# Patient Record
Sex: FEMALE | Race: WHITE | NOT HISPANIC OR LATINO | Employment: OTHER | URBAN - METROPOLITAN AREA
[De-identification: names, ages, dates, MRNs, and addresses within clinical notes are randomized per-mention and may not be internally consistent; named-entity substitution may affect disease eponyms.]

---

## 2019-01-02 ENCOUNTER — APPOINTMENT (EMERGENCY)
Dept: RADIOLOGY | Facility: HOSPITAL | Age: 84
DRG: 871 | End: 2019-01-02
Payer: MEDICARE

## 2019-01-02 ENCOUNTER — HOSPITAL ENCOUNTER (INPATIENT)
Facility: HOSPITAL | Age: 84
LOS: 7 days | Discharge: HOME HEALTH | DRG: 871 | End: 2019-01-10
Attending: EMERGENCY MEDICINE | Admitting: INTERNAL MEDICINE
Payer: MEDICARE

## 2019-01-02 ENCOUNTER — APPOINTMENT (EMERGENCY)
Dept: CT IMAGING | Facility: HOSPITAL | Age: 84
End: 2019-01-02
Payer: MEDICARE

## 2019-01-02 ENCOUNTER — HOSPITAL ENCOUNTER (EMERGENCY)
Facility: HOSPITAL | Age: 84
Discharge: STILL ADMITTED OR ADMINISTRATIVE | End: 2019-01-02
Attending: EMERGENCY MEDICINE
Payer: MEDICARE

## 2019-01-02 VITALS
WEIGHT: 105.82 LBS | RESPIRATION RATE: 32 BRPM | DIASTOLIC BLOOD PRESSURE: 105 MMHG | HEART RATE: 118 BPM | OXYGEN SATURATION: 97 % | SYSTOLIC BLOOD PRESSURE: 217 MMHG

## 2019-01-02 DIAGNOSIS — R77.8 ELEVATED TROPONIN: ICD-10-CM

## 2019-01-02 DIAGNOSIS — R09.02 HYPOXIA: ICD-10-CM

## 2019-01-02 DIAGNOSIS — R26.2 AMBULATORY DYSFUNCTION: ICD-10-CM

## 2019-01-02 DIAGNOSIS — R79.89 ELEVATED LACTIC ACID LEVEL: ICD-10-CM

## 2019-01-02 DIAGNOSIS — E86.0 DEHYDRATION: ICD-10-CM

## 2019-01-02 DIAGNOSIS — D64.9 ANEMIA: ICD-10-CM

## 2019-01-02 DIAGNOSIS — B35.1 ONYCHOMYCOSIS: ICD-10-CM

## 2019-01-02 DIAGNOSIS — G93.41 ACUTE METABOLIC ENCEPHALOPATHY: ICD-10-CM

## 2019-01-02 DIAGNOSIS — K59.00 CONSTIPATION: ICD-10-CM

## 2019-01-02 DIAGNOSIS — R11.10 VOMITING: ICD-10-CM

## 2019-01-02 DIAGNOSIS — R55 SYNCOPE: ICD-10-CM

## 2019-01-02 DIAGNOSIS — J69.0 ASPIRATION PNEUMONIA (HCC): Primary | ICD-10-CM

## 2019-01-02 DIAGNOSIS — E87.0 HYPERNATREMIA: ICD-10-CM

## 2019-01-02 LAB
ANION GAP BLD CALC-SCNC: 18 MMOL/L (ref 4–13)
BUN BLD-MCNC: 38 MG/DL (ref 5–25)
CA-I BLD-SCNC: 1.22 MMOL/L (ref 1.12–1.32)
CHLORIDE BLD-SCNC: 105 MMOL/L (ref 100–108)
CREAT BLD-MCNC: 1.1 MG/DL (ref 0.6–1.3)
GFR SERPL CREATININE-BSD FRML MDRD: 44 ML/MIN/1.73SQ M
GLUCOSE SERPL-MCNC: 343 MG/DL (ref 65–140)
HCT VFR BLD CALC: 41 % (ref 34.8–46.1)
HGB BLDA-MCNC: 13.9 G/DL (ref 11.5–15.4)
PCO2 BLD: 24 MMOL/L (ref 21–32)
POTASSIUM BLD-SCNC: 4 MMOL/L (ref 3.5–5.3)
SODIUM BLD-SCNC: 142 MMOL/L (ref 136–145)
SPECIMEN SOURCE: ABNORMAL
SPECIMEN SOURCE: NORMAL
TROPONIN I BLD-MCNC: 0.02 NG/ML (ref 0–0.08)

## 2019-01-02 PROCEDURE — 0T9B70Z DRAINAGE OF BLADDER WITH DRAINAGE DEVICE, VIA NATURAL OR ARTIFICIAL OPENING: ICD-10-PCS | Performed by: INTERNAL MEDICINE

## 2019-01-02 PROCEDURE — 93005 ELECTROCARDIOGRAM TRACING: CPT

## 2019-01-02 PROCEDURE — 71045 X-RAY EXAM CHEST 1 VIEW: CPT

## 2019-01-02 PROCEDURE — 85610 PROTHROMBIN TIME: CPT | Performed by: EMERGENCY MEDICINE

## 2019-01-02 PROCEDURE — 94660 CPAP INITIATION&MGMT: CPT

## 2019-01-02 PROCEDURE — 99291 CRITICAL CARE FIRST HOUR: CPT

## 2019-01-02 PROCEDURE — 85025 COMPLETE CBC W/AUTO DIFF WBC: CPT | Performed by: EMERGENCY MEDICINE

## 2019-01-02 PROCEDURE — 82140 ASSAY OF AMMONIA: CPT | Performed by: EMERGENCY MEDICINE

## 2019-01-02 PROCEDURE — 85014 HEMATOCRIT: CPT

## 2019-01-02 PROCEDURE — 85730 THROMBOPLASTIN TIME PARTIAL: CPT | Performed by: EMERGENCY MEDICINE

## 2019-01-02 PROCEDURE — 87040 BLOOD CULTURE FOR BACTERIA: CPT | Performed by: EMERGENCY MEDICINE

## 2019-01-02 PROCEDURE — 84484 ASSAY OF TROPONIN QUANT: CPT

## 2019-01-02 PROCEDURE — 36415 COLL VENOUS BLD VENIPUNCTURE: CPT | Performed by: EMERGENCY MEDICINE

## 2019-01-02 PROCEDURE — 94760 N-INVAS EAR/PLS OXIMETRY 1: CPT

## 2019-01-02 PROCEDURE — 1124F ACP DISCUSS-NO DSCNMKR DOCD: CPT | Performed by: INTERNAL MEDICINE

## 2019-01-02 PROCEDURE — 83605 ASSAY OF LACTIC ACID: CPT | Performed by: EMERGENCY MEDICINE

## 2019-01-02 PROCEDURE — 80047 BASIC METABLC PNL IONIZED CA: CPT

## 2019-01-02 PROCEDURE — 80320 DRUG SCREEN QUANTALCOHOLS: CPT | Performed by: EMERGENCY MEDICINE

## 2019-01-02 PROCEDURE — 85379 FIBRIN DEGRADATION QUANT: CPT | Performed by: EMERGENCY MEDICINE

## 2019-01-02 RX ORDER — ONDANSETRON 2 MG/ML
4 INJECTION INTRAMUSCULAR; INTRAVENOUS ONCE
Status: COMPLETED | OUTPATIENT
Start: 2019-01-02 | End: 2019-01-03

## 2019-01-02 RX ORDER — SODIUM CHLORIDE 9 MG/ML
125 INJECTION, SOLUTION INTRAVENOUS CONTINUOUS
Status: DISCONTINUED | OUTPATIENT
Start: 2019-01-03 | End: 2019-01-03

## 2019-01-03 ENCOUNTER — APPOINTMENT (EMERGENCY)
Dept: CT IMAGING | Facility: HOSPITAL | Age: 84
DRG: 871 | End: 2019-01-03
Payer: MEDICARE

## 2019-01-03 PROBLEM — D72.829 LEUKOCYTOSIS: Status: ACTIVE | Noted: 2019-01-03

## 2019-01-03 PROBLEM — R73.9 HYPERGLYCEMIA: Status: ACTIVE | Noted: 2019-01-03

## 2019-01-03 PROBLEM — A41.9 SEVERE SEPSIS (HCC): Status: ACTIVE | Noted: 2019-01-03

## 2019-01-03 PROBLEM — R65.20 SEVERE SEPSIS (HCC): Status: ACTIVE | Noted: 2019-01-03

## 2019-01-03 PROBLEM — E87.2 LACTIC ACIDOSIS: Status: ACTIVE | Noted: 2019-01-03

## 2019-01-03 PROBLEM — B35.1 ONYCHOMYCOSIS OF TOENAIL: Status: ACTIVE | Noted: 2019-01-03

## 2019-01-03 PROBLEM — E87.2 LACTIC ACIDOSIS: Status: RESOLVED | Noted: 2019-01-03 | Resolved: 2019-01-03

## 2019-01-03 PROBLEM — G93.41 ACUTE METABOLIC ENCEPHALOPATHY: Status: ACTIVE | Noted: 2019-01-03

## 2019-01-03 PROBLEM — J69.0 ASPIRATION PNEUMONIA (HCC): Status: ACTIVE | Noted: 2019-01-03

## 2019-01-03 PROBLEM — T17.910A ASPIRATION OF GASTRIC CONTENTS: Status: ACTIVE | Noted: 2019-01-03

## 2019-01-03 PROBLEM — J96.01 ACUTE RESPIRATORY FAILURE WITH HYPOXIA (HCC): Status: ACTIVE | Noted: 2019-01-03

## 2019-01-03 PROBLEM — I21.4 NSTEMI (NON-ST ELEVATED MYOCARDIAL INFARCTION) (HCC): Status: ACTIVE | Noted: 2019-01-03

## 2019-01-03 PROBLEM — R77.8 ELEVATED TROPONIN: Status: ACTIVE | Noted: 2019-01-03

## 2019-01-03 LAB
ADDITIONAL SETTING: 10
ALBUMIN SERPL BCP-MCNC: 3.6 G/DL (ref 3.5–5)
ALP SERPL-CCNC: 76 U/L (ref 46–116)
ALT SERPL W P-5'-P-CCNC: 30 U/L (ref 12–78)
AMMONIA PLAS-SCNC: 14 UMOL/L (ref 11–35)
ANCILLARY VALUES: ABNORMAL
ANION GAP SERPL CALCULATED.3IONS-SCNC: 12 MMOL/L (ref 4–13)
APAP SERPL-MCNC: <2 UG/ML (ref 10–30)
APTT PPP: 22 SECONDS (ref 26–38)
ARTERIAL PATENCY WRIST A: ABNORMAL
AST SERPL W P-5'-P-CCNC: 24 U/L (ref 5–45)
ATRIAL RATE: 112 BPM
BACTERIA UR QL AUTO: ABNORMAL /HPF
BASE EXCESS BLDA CALC-SCNC: -5 MMOL/L (ref -2–3)
BASOPHILS # BLD AUTO: 0.06 THOUSANDS/ΜL (ref 0–0.1)
BASOPHILS NFR BLD AUTO: 1 % (ref 0–1)
BILIRUB SERPL-MCNC: 0.5 MG/DL (ref 0.2–1)
BILIRUB UR QL STRIP: NEGATIVE
BUN SERPL-MCNC: 41 MG/DL (ref 5–25)
CA-I BLD-SCNC: 1.18 MMOL/L (ref 1.12–1.32)
CALCIUM SERPL-MCNC: 8.7 MG/DL (ref 8.3–10.1)
CHLORIDE SERPL-SCNC: 106 MMOL/L (ref 100–108)
CK MB SERPL-MCNC: 1.7 % (ref 0–2.5)
CK MB SERPL-MCNC: 15.6 NG/ML (ref 0–5)
CK SERPL-CCNC: 939 U/L (ref 26–192)
CLARITY UR: CLEAR
CO2 SERPL-SCNC: 25 MMOL/L (ref 21–32)
COLOR UR: YELLOW
CREAT SERPL-MCNC: 1.2 MG/DL (ref 0.6–1.3)
DEPRECATED D DIMER PPP: 3559 NG/ML (FEU)
DS:DELIVERY SYSTEM: ABNORMAL
EOSINOPHIL # BLD AUTO: 0.15 THOUSAND/ΜL (ref 0–0.61)
EOSINOPHIL NFR BLD AUTO: 1 % (ref 0–6)
ERYTHROCYTE [DISTWIDTH] IN BLOOD BY AUTOMATED COUNT: 12.9 % (ref 11.6–15.1)
EST. AVERAGE GLUCOSE BLD GHB EST-MCNC: 100 MG/DL
ETHANOL SERPL-MCNC: <3 MG/DL (ref 0–3)
FIO2 GAS DIL.REBREATH: 70 L
FLUAV AG SPEC QL: NORMAL
FLUBV AG SPEC QL: NORMAL
GFR SERPL CREATININE-BSD FRML MDRD: 40 ML/MIN/1.73SQ M
GLUCOSE SERPL-MCNC: 115 MG/DL (ref 65–140)
GLUCOSE SERPL-MCNC: 119 MG/DL (ref 65–140)
GLUCOSE SERPL-MCNC: 122 MG/DL (ref 65–140)
GLUCOSE SERPL-MCNC: 136 MG/DL (ref 65–140)
GLUCOSE SERPL-MCNC: 144 MG/DL (ref 65–140)
GLUCOSE SERPL-MCNC: 288 MG/DL (ref 65–140)
GLUCOSE SERPL-MCNC: 323 MG/DL (ref 65–140)
GLUCOSE SERPL-MCNC: 341 MG/DL (ref 65–140)
GLUCOSE UR STRIP-MCNC: ABNORMAL MG/DL
HBA1C MFR BLD: 5.1 % (ref 4.2–6.3)
HCO3 BLDA-SCNC: 22.2 MMOL/L (ref 22–28)
HCT VFR BLD AUTO: 43.2 % (ref 34.8–46.1)
HCT VFR BLD CALC: 39 % (ref 34.8–46.1)
HGB BLD-MCNC: 13.3 G/DL (ref 11.5–15.4)
HGB BLDA-MCNC: 13.3 G/DL (ref 11.5–15.4)
HGB UR QL STRIP.AUTO: ABNORMAL
IMM GRANULOCYTES # BLD AUTO: 0.1 THOUSAND/UL (ref 0–0.2)
IMM GRANULOCYTES NFR BLD AUTO: 1 % (ref 0–2)
INR PPP: 1.09 (ref 0.86–1.17)
KETONES UR STRIP-MCNC: NEGATIVE MG/DL
LACTATE SERPL-SCNC: 2 MMOL/L (ref 0.5–2)
LACTATE SERPL-SCNC: 3.1 MMOL/L (ref 0.5–2)
LACTATE SERPL-SCNC: 4.6 MMOL/L (ref 0.5–2)
LEUKOCYTE ESTERASE UR QL STRIP: NEGATIVE
LYMPHOCYTES # BLD AUTO: 2.61 THOUSANDS/ΜL (ref 0.6–4.47)
LYMPHOCYTES NFR BLD AUTO: 20 % (ref 14–44)
MCH RBC QN AUTO: 31.3 PG (ref 26.8–34.3)
MCHC RBC AUTO-ENTMCNC: 30.8 G/DL (ref 31.4–37.4)
MCV RBC AUTO: 102 FL (ref 82–98)
MONOCYTES # BLD AUTO: 0.29 THOUSAND/ΜL (ref 0.17–1.22)
MONOCYTES NFR BLD AUTO: 2 % (ref 4–12)
NEUTROPHILS # BLD AUTO: 9.9 THOUSANDS/ΜL (ref 1.85–7.62)
NEUTS SEG NFR BLD AUTO: 75 % (ref 43–75)
NITRITE UR QL STRIP: NEGATIVE
NON-SQ EPI CELLS URNS QL MICRO: ABNORMAL /HPF
NRBC BLD AUTO-RTO: 0 /100 WBCS
NT-PROBNP SERPL-MCNC: 210 PG/ML
P AXIS: 88 DEGREES
PCO2 BLD: 24 MMOL/L (ref 21–32)
PCO2 BLD: 49.8 MM HG (ref 36–44)
PH BLD: 7.26 [PH] (ref 7.35–7.45)
PH UR STRIP.AUTO: 6 [PH] (ref 4.5–8)
PLATELET # BLD AUTO: 233 THOUSANDS/UL (ref 149–390)
PMV BLD AUTO: 9.3 FL (ref 8.9–12.7)
PO2 BLD: 152 MM HG (ref 75–129)
POTASSIUM BLD-SCNC: 3.8 MMOL/L (ref 3.5–5.3)
POTASSIUM SERPL-SCNC: 4.3 MMOL/L (ref 3.5–5.3)
PR INTERVAL: 144 MS
PRESSURE SETTING: 5
PROCALCITONIN SERPL-MCNC: 14.79 NG/ML
PROT SERPL-MCNC: 6.4 G/DL (ref 6.4–8.2)
PROT UR STRIP-MCNC: ABNORMAL MG/DL
PROTHROMBIN TIME: 13.8 SECONDS (ref 11.8–14.2)
QRS AXIS: -47 DEGREES
QRSD INTERVAL: 92 MS
QT INTERVAL: 324 MS
QTC INTERVAL: 442 MS
RBC # BLD AUTO: 4.25 MILLION/UL (ref 3.81–5.12)
RBC #/AREA URNS AUTO: ABNORMAL /HPF
RESPIRATORY RATE: 12
RSV B RNA SPEC QL NAA+PROBE: NORMAL
SALICYLATES SERPL-MCNC: <3 MG/DL (ref 3–20)
SAMPLE SITE: ABNORMAL
SAO2 % BLD FROM PO2: 99 % (ref 95–98)
SODIUM BLD-SCNC: 141 MMOL/L (ref 136–145)
SODIUM SERPL-SCNC: 143 MMOL/L (ref 136–145)
SP GR UR STRIP.AUTO: 1.02 (ref 1–1.03)
SPECIMEN SOURCE: ABNORMAL
T WAVE AXIS: 94 DEGREES
TROPONIN I SERPL-MCNC: 0.09 NG/ML
TROPONIN I SERPL-MCNC: 0.37 NG/ML
TROPONIN I SERPL-MCNC: 0.39 NG/ML
TROPONIN I SERPL-MCNC: 0.5 NG/ML
TROPONIN I SERPL-MCNC: 0.78 NG/ML
TROPONIN I SERPL-MCNC: 1.02 NG/ML
TROPONIN I SERPL-MCNC: 1.28 NG/ML
TROPONIN I SERPL-MCNC: 1.34 NG/ML
TSH SERPL DL<=0.05 MIU/L-ACNC: 2.76 UIU/ML (ref 0.36–3.74)
UROBILINOGEN UR QL STRIP.AUTO: 0.2 E.U./DL
VENTILATION VALUE: 0
VENTRICULAR RATE: 112 BPM
WBC # BLD AUTO: 13.11 THOUSAND/UL (ref 4.31–10.16)
WBC #/AREA URNS AUTO: ABNORMAL /HPF

## 2019-01-03 PROCEDURE — 82948 REAGENT STRIP/BLOOD GLUCOSE: CPT

## 2019-01-03 PROCEDURE — 84295 ASSAY OF SERUM SODIUM: CPT

## 2019-01-03 PROCEDURE — 80053 COMPREHEN METABOLIC PANEL: CPT | Performed by: EMERGENCY MEDICINE

## 2019-01-03 PROCEDURE — 74177 CT ABD & PELVIS W/CONTRAST: CPT

## 2019-01-03 PROCEDURE — 93010 ELECTROCARDIOGRAM REPORT: CPT | Performed by: INTERNAL MEDICINE

## 2019-01-03 PROCEDURE — 94660 CPAP INITIATION&MGMT: CPT

## 2019-01-03 PROCEDURE — 83880 ASSAY OF NATRIURETIC PEPTIDE: CPT | Performed by: EMERGENCY MEDICINE

## 2019-01-03 PROCEDURE — 82553 CREATINE MB FRACTION: CPT | Performed by: NURSE PRACTITIONER

## 2019-01-03 PROCEDURE — 84443 ASSAY THYROID STIM HORMONE: CPT | Performed by: EMERGENCY MEDICINE

## 2019-01-03 PROCEDURE — 82330 ASSAY OF CALCIUM: CPT

## 2019-01-03 PROCEDURE — 84484 ASSAY OF TROPONIN QUANT: CPT | Performed by: EMERGENCY MEDICINE

## 2019-01-03 PROCEDURE — 97163 PT EVAL HIGH COMPLEX 45 MIN: CPT

## 2019-01-03 PROCEDURE — 80329 ANALGESICS NON-OPIOID 1 OR 2: CPT | Performed by: EMERGENCY MEDICINE

## 2019-01-03 PROCEDURE — G8979 MOBILITY GOAL STATUS: HCPCS

## 2019-01-03 PROCEDURE — 71275 CT ANGIOGRAPHY CHEST: CPT

## 2019-01-03 PROCEDURE — 36600 WITHDRAWAL OF ARTERIAL BLOOD: CPT

## 2019-01-03 PROCEDURE — 85014 HEMATOCRIT: CPT

## 2019-01-03 PROCEDURE — 87081 CULTURE SCREEN ONLY: CPT | Performed by: NURSE PRACTITIONER

## 2019-01-03 PROCEDURE — 96375 TX/PRO/DX INJ NEW DRUG ADDON: CPT

## 2019-01-03 PROCEDURE — 87631 RESP VIRUS 3-5 TARGETS: CPT | Performed by: NURSE PRACTITIONER

## 2019-01-03 PROCEDURE — 84145 PROCALCITONIN (PCT): CPT | Performed by: NURSE PRACTITIONER

## 2019-01-03 PROCEDURE — 96365 THER/PROPH/DIAG IV INF INIT: CPT

## 2019-01-03 PROCEDURE — 96361 HYDRATE IV INFUSION ADD-ON: CPT

## 2019-01-03 PROCEDURE — G8978 MOBILITY CURRENT STATUS: HCPCS

## 2019-01-03 PROCEDURE — 82803 BLOOD GASES ANY COMBINATION: CPT

## 2019-01-03 PROCEDURE — 84132 ASSAY OF SERUM POTASSIUM: CPT

## 2019-01-03 PROCEDURE — 94760 N-INVAS EAR/PLS OXIMETRY 1: CPT

## 2019-01-03 PROCEDURE — 99223 1ST HOSP IP/OBS HIGH 75: CPT | Performed by: INTERNAL MEDICINE

## 2019-01-03 PROCEDURE — 87040 BLOOD CULTURE FOR BACTERIA: CPT | Performed by: EMERGENCY MEDICINE

## 2019-01-03 PROCEDURE — 36415 COLL VENOUS BLD VENIPUNCTURE: CPT | Performed by: EMERGENCY MEDICINE

## 2019-01-03 PROCEDURE — 83605 ASSAY OF LACTIC ACID: CPT | Performed by: EMERGENCY MEDICINE

## 2019-01-03 PROCEDURE — 82550 ASSAY OF CK (CPK): CPT | Performed by: NURSE PRACTITIONER

## 2019-01-03 PROCEDURE — 84484 ASSAY OF TROPONIN QUANT: CPT | Performed by: NURSE PRACTITIONER

## 2019-01-03 PROCEDURE — 93005 ELECTROCARDIOGRAM TRACING: CPT

## 2019-01-03 PROCEDURE — 83036 HEMOGLOBIN GLYCOSYLATED A1C: CPT | Performed by: NURSE PRACTITIONER

## 2019-01-03 PROCEDURE — 81001 URINALYSIS AUTO W/SCOPE: CPT

## 2019-01-03 PROCEDURE — 70450 CT HEAD/BRAIN W/O DYE: CPT

## 2019-01-03 PROCEDURE — 82947 ASSAY GLUCOSE BLOOD QUANT: CPT

## 2019-01-03 PROCEDURE — 96368 THER/DIAG CONCURRENT INF: CPT

## 2019-01-03 RX ORDER — VANCOMYCIN HYDROCHLORIDE 1 G/200ML
1000 INJECTION, SOLUTION INTRAVENOUS ONCE
Status: COMPLETED | OUTPATIENT
Start: 2019-01-03 | End: 2019-01-03

## 2019-01-03 RX ORDER — HEPARIN SODIUM 5000 [USP'U]/ML
5000 INJECTION, SOLUTION INTRAVENOUS; SUBCUTANEOUS EVERY 12 HOURS SCHEDULED
Status: DISCONTINUED | OUTPATIENT
Start: 2019-01-03 | End: 2019-01-10 | Stop reason: HOSPADM

## 2019-01-03 RX ORDER — ACETAMINOPHEN 650 MG/1
650 SUPPOSITORY RECTAL ONCE
Status: COMPLETED | OUTPATIENT
Start: 2019-01-03 | End: 2019-01-03

## 2019-01-03 RX ORDER — SODIUM CHLORIDE, SODIUM GLUCONATE, SODIUM ACETATE, POTASSIUM CHLORIDE, MAGNESIUM CHLORIDE, SODIUM PHOSPHATE, DIBASIC, AND POTASSIUM PHOSPHATE .53; .5; .37; .037; .03; .012; .00082 G/100ML; G/100ML; G/100ML; G/100ML; G/100ML; G/100ML; G/100ML
75 INJECTION, SOLUTION INTRAVENOUS CONTINUOUS
Status: DISCONTINUED | OUTPATIENT
Start: 2019-01-03 | End: 2019-01-04

## 2019-01-03 RX ORDER — HEPARIN SODIUM 5000 [USP'U]/ML
5000 INJECTION, SOLUTION INTRAVENOUS; SUBCUTANEOUS EVERY 8 HOURS SCHEDULED
Status: DISCONTINUED | OUTPATIENT
Start: 2019-01-03 | End: 2019-01-03

## 2019-01-03 RX ORDER — RIBOFLAVIN (VITAMIN B2) 100 MG
100 TABLET ORAL DAILY
COMMUNITY

## 2019-01-03 RX ORDER — LEVALBUTEROL 1.25 MG/.5ML
1.25 SOLUTION, CONCENTRATE RESPIRATORY (INHALATION) EVERY 4 HOURS PRN
Status: DISCONTINUED | OUTPATIENT
Start: 2019-01-03 | End: 2019-01-05

## 2019-01-03 RX ORDER — ONDANSETRON 2 MG/ML
4 INJECTION INTRAMUSCULAR; INTRAVENOUS EVERY 4 HOURS PRN
Status: DISCONTINUED | OUTPATIENT
Start: 2019-01-03 | End: 2019-01-10 | Stop reason: HOSPADM

## 2019-01-03 RX ORDER — CHLORHEXIDINE GLUCONATE 0.12 MG/ML
15 RINSE ORAL EVERY 12 HOURS SCHEDULED
Status: DISCONTINUED | OUTPATIENT
Start: 2019-01-03 | End: 2019-01-04

## 2019-01-03 RX ORDER — AMOXICILLIN 250 MG
1 CAPSULE ORAL 2 TIMES DAILY
Status: DISCONTINUED | OUTPATIENT
Start: 2019-01-03 | End: 2019-01-10 | Stop reason: HOSPADM

## 2019-01-03 RX ORDER — DIPHENOXYLATE HYDROCHLORIDE AND ATROPINE SULFATE 2.5; .025 MG/1; MG/1
1 TABLET ORAL DAILY
COMMUNITY

## 2019-01-03 RX ORDER — VANCOMYCIN HYDROCHLORIDE 500 MG/100ML
10 INJECTION, SOLUTION INTRAVENOUS EVERY 24 HOURS
Status: DISCONTINUED | OUTPATIENT
Start: 2019-01-04 | End: 2019-01-04

## 2019-01-03 RX ADMIN — VANCOMYCIN HYDROCHLORIDE 1000 MG: 1 INJECTION, SOLUTION INTRAVENOUS at 02:12

## 2019-01-03 RX ADMIN — CEFEPIME HYDROCHLORIDE 1000 MG: 1 INJECTION, POWDER, FOR SOLUTION INTRAMUSCULAR; INTRAVENOUS at 08:37

## 2019-01-03 RX ADMIN — ONDANSETRON 4 MG: 2 INJECTION INTRAMUSCULAR; INTRAVENOUS at 00:16

## 2019-01-03 RX ADMIN — SODIUM CHLORIDE, SODIUM GLUCONATE, SODIUM ACETATE, POTASSIUM CHLORIDE, MAGNESIUM CHLORIDE, SODIUM PHOSPHATE, DIBASIC, AND POTASSIUM PHOSPHATE 75 ML/HR: .53; .5; .37; .037; .03; .012; .00082 INJECTION, SOLUTION INTRAVENOUS at 22:31

## 2019-01-03 RX ADMIN — PIPERACILLIN SODIUM AND TAZOBACTAM SODIUM 3.38 G: 36; 4.5 INJECTION, POWDER, FOR SOLUTION INTRAVENOUS at 02:08

## 2019-01-03 RX ADMIN — METRONIDAZOLE 500 MG: 500 INJECTION, SOLUTION INTRAVENOUS at 14:50

## 2019-01-03 RX ADMIN — ACETAMINOPHEN 650 MG: 650 SUPPOSITORY RECTAL at 03:00

## 2019-01-03 RX ADMIN — HEPARIN SODIUM 5000 UNITS: 5000 INJECTION, SOLUTION INTRAVENOUS; SUBCUTANEOUS at 06:14

## 2019-01-03 RX ADMIN — SODIUM CHLORIDE 500 ML: 0.9 INJECTION, SOLUTION INTRAVENOUS at 01:19

## 2019-01-03 RX ADMIN — SODIUM CHLORIDE, SODIUM GLUCONATE, SODIUM ACETATE, POTASSIUM CHLORIDE, MAGNESIUM CHLORIDE, SODIUM PHOSPHATE, DIBASIC, AND POTASSIUM PHOSPHATE 75 ML/HR: .53; .5; .37; .037; .03; .012; .00082 INJECTION, SOLUTION INTRAVENOUS at 07:42

## 2019-01-03 RX ADMIN — SODIUM CHLORIDE 500 ML: 0.9 INJECTION, SOLUTION INTRAVENOUS at 00:15

## 2019-01-03 RX ADMIN — METRONIDAZOLE 500 MG: 500 INJECTION, SOLUTION INTRAVENOUS at 07:45

## 2019-01-03 RX ADMIN — IODIXANOL 70 ML: 320 INJECTION, SOLUTION INTRAVASCULAR at 01:40

## 2019-01-03 RX ADMIN — HEPARIN SODIUM 5000 UNITS: 5000 INJECTION, SOLUTION INTRAVENOUS; SUBCUTANEOUS at 22:32

## 2019-01-03 RX ADMIN — SODIUM CHLORIDE 500 ML: 0.9 INJECTION, SOLUTION INTRAVENOUS at 03:38

## 2019-01-03 RX ADMIN — SODIUM CHLORIDE 125 ML/HR: 0.9 INJECTION, SOLUTION INTRAVENOUS at 03:18

## 2019-01-03 RX ADMIN — METRONIDAZOLE 500 MG: 500 INJECTION, SOLUTION INTRAVENOUS at 22:32

## 2019-01-03 NOTE — ED PROVIDER NOTES
History  Chief Complaint   Patient presents with    Respiratory Distress     Pt comes via EMs from home  Pt ambulated to the bathroom where she vomited in the toliet and found unresponsive on the floor  Pt was in the bathroom for 15 minutes before someone found her  Pt SpO2 was >50 with agonal breathing and RR of 6 when EMS arrived  EMS placed patient in CPAP  Pt vomited 2x for EMS  Pt SpO2 improved for EMS and pt woke up and became noncompliant and combative  Per EMS family states pt take no meds and is in perfect health  Pt reciceved 4mg of zofran from EMS and bs of 291   Vomiting     Patient is a 80year old female with vomiting tonight and was found unresponsive by the toilet tonight with decreased respirations  Patient became more awake for paramedics and was reportedly combative  Patient unable to provide complete hx and needed my immediate attention  Patient was BVM'd in the field and then placed on CPAP  Patient vomited and aspirated as per medic  No known fever  No recent old records from this ED seen on computer system  [facility] website checked on this patient and patient not found  History provided by:  Patient and EMS personnel  History limited by:  Mental status change   used: No        Prior to Admission Medications   Prescriptions Last Dose Informant Patient Reported? Taking? Ascorbic Acid (VITAMIN C) 100 MG tablet   Yes Yes   Sig: Take 100 mg by mouth daily   multivitamin (THERAGRAN) TABS   Yes Yes   Sig: Take 1 tablet by mouth daily   vitamin E 100 UNIT capsule   Yes Yes   Sig: Take 180 Units by mouth daily      Facility-Administered Medications: None       Past Medical History:   Diagnosis Date    Cancer ([facility] [address] )     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  Past Surgical History:   Procedure Laterality Date    OTHER SURGICAL HISTORY      Pt had surgery on head for skin cancer  History reviewed  No pertinent family history    I have reviewed and agree with the history as documented  Social History   Substance Use Topics    Smoking status: Former Smoker     Quit date: 1/3/1950    Smokeless tobacco: Never Used    Alcohol use No        Review of Systems   Unable to perform ROS: Mental status change   Gastrointestinal: Positive for vomiting  Physical Exam  Physical Exam   Constitutional: She appears distressed (moderate)  HENT:   Head: Normocephalic  Mucous membranes somewhat moist     Eyes: Pupils are equal, round, and reactive to light  No scleral icterus  Neck: Normal range of motion  Neck supple  No JVD present  No tracheal deviation present  Cardiovascular: Regular rhythm and normal heart sounds  No murmur heard  Tachycardia  Pulmonary/Chest: No stridor  She is in respiratory distress  She has no wheezes  She has no rales  Breath sounds diminished bilateral bases  Abdominal: Soft  Bowel sounds are normal  She exhibits no distension  There is no tenderness  Musculoskeletal: She exhibits no edema or deformity  Neurological:   Awake  No gross focal deficits  Skin: Skin is warm and dry  No rash noted  Psychiatric: She has a normal mood and affect  Nursing note and vitals reviewed        Vital Signs  ED Triage Vitals   Temperature Pulse Respirations Blood Pressure SpO2   01/03/19 0001 01/02/19 2345 01/02/19 2345 01/02/19 2345 01/02/19 2345   98 4 °F (36 9 °C) (!) 118 (!) 32 (!) 217/105 97 %      Temp Source Heart Rate Source Patient Position - Orthostatic VS BP Location FiO2 (%)   01/03/19 0001 01/02/19 2345 01/02/19 2345 01/02/19 2345 01/02/19 2347   Axillary Monitor Sitting Left arm 70      Pain Score       01/03/19 0038       No Pain           Vitals:    01/03/19 0200 01/03/19 0230 01/03/19 0300 01/03/19 0330   BP:  144/86 149/82 114/67   Pulse: (!) 114 (!) 110 (!) 114 104   Patient Position - Orthostatic VS:   Sitting Lying       Visual Acuity  Visual Acuity      Most Recent Value   L Pupil Size (mm)  4   R Pupil Size (mm)  4          ED Medications  Medications   sodium chloride 0 9 % infusion (125 mL/hr Intravenous New Bag 1/3/19 0318)   sodium chloride 0 9 % bolus 500 mL (500 mL Intravenous New Bag 1/3/19 0338)   chlorhexidine (PERIDEX) 0 12 % oral rinse 15 mL (not administered)   heparin (porcine) subcutaneous injection 5,000 Units (not administered)   piperacillin-tazobactam (ZOSYN) 3 375 g in sodium chloride 0 9 % 50 mL IVPB (not administered)   ondansetron (ZOFRAN) injection 4 mg (4 mg Intravenous Given 1/3/19 0016)   sodium chloride 0 9 % bolus 500 mL (0 mL Intravenous Stopped 1/3/19 0114)    EMS REPLENISHMENT MED ( Does not apply Given to EMS 1/3/19 0012)   piperacillin-tazobactam (ZOSYN) 3 375 g in sodium chloride 0 9 % 50 mL IVPB (0 g Intravenous Stopped 1/3/19 0319)   vancomycin (VANCOCIN) IVPB (premix) 1,000 mg (0 mg Intravenous Stopped 1/3/19 0318)   sodium chloride 0 9 % bolus 500 mL (0 mL Intravenous Stopped 1/3/19 0337)   iodixanol (VISIPAQUE) 320 MG/ML injection 70 mL (70 mL Intravenous Given 1/3/19 0140)   acetaminophen (TYLENOL) rectal suppository 650 mg (650 mg Rectal Given 1/3/19 0300)       Diagnostic Studies  Results Reviewed     Procedure Component Value Units Date/Time    Hemoglobin A1C w/ EAG Estimation [[id number]]     Lab Status:  No result Specimen:  Blood     Lactic acid, plasma [[id number]]     Lab Status:  No result Specimen:  Blood     Lactic acid, plasma [[id number]]  (Abnormal) Collected:  01/03/19 0158    Lab Status:  Final result Specimen:  Blood from Arm, Right Updated:  01/03/19 0231     LACTIC ACID 3 1 (HH) mmol/L     Narrative:         Result may be elevated if tourniquet was used during collection      Urine Microscopic [[id number]]  (Abnormal) Collected:  01/03/19 0156    Lab Status:  Final result Specimen:  Urine from Urine, Indwelling Albert Catheter Updated:  01/03/19 0217     RBC, UA 4-10 (A) /hpf      WBC, UA None Seen /hpf      Epithelial Cells None Seen /hpf      Bacteria, UA None Seen /hpf     ED Urine Macroscopic [[id number]]  (Abnormal) Collected:  01/03/19 0156    Lab Status:  Final result Specimen:  Urine Updated:  01/03/19 0156     Color, UA Yellow     Clarity, UA Clear     pH, UA 6 0     Leukocytes, UA Negative     Nitrite, UA Negative     Protein, UA 30 (1+) (A) mg/dl      Glucose,  (1/2%) (A) mg/dl      Ketones, UA Negative mg/dl      Urobilinogen, UA 0 2 E U /dl      Bilirubin, UA Negative     Blood, UA Small (A)     Specific [place], UA 1 025    Narrative:       CLINITEK RESULT    Blood culture #1 [[id number]] Collected:  01/03/19 0117    Lab Status: In process Specimen:  Blood from Arm, Left Updated:  01/03/19 0121    TSH [[id number]]  (Normal) Collected:  01/03/19 0013    Lab Status:  Final result Specimen:  Blood from Arm, Right Updated:  01/03/19 0051     TSH 3RD GENERATON 2 758 uIU/mL     Narrative:         Patients undergoing fluorescein dye angiography may retain small amounts of fluorescein in the body for 48-72 hours post procedure  Samples containing fluorescein can produce falsely depressed TSH values  If the patient had this procedure,a specimen should be resubmitted post fluorescein clearance            The recommended reference ranges for TSH during pregnancy are as follows:  First trimester 0 1 to 2 5 uIU/mL  Second trimester  0 2 to 3 0 uIU/mL  Third trimester 0 3 to 3 0 uIU/m      B-type natriuretic peptide [[id number]]  (Normal) Collected:  01/03/19 0013    Lab Status:  Final result Specimen:  Blood from Arm, Right Updated:  01/03/19 0051     NT-proBNP 868 pg/mL     Salicylate level [[id number]]  (Abnormal) Collected:  01/03/19 0013    Lab Status:  Final result Specimen:  Blood from Arm, Right Updated:  84/15/32 2835     Salicylate Lvl <3 (L) mg/dL     Acetaminophen level [[id number]]  (Abnormal) Collected:  01/03/19 0013    Lab Status:  Final result Specimen:  Blood from Arm, Right Updated:  01/03/19 0051     Acetaminophen Level <2 (L) ug/mL     Troponin I [[id number]]  (Abnormal) Collected:  01/03/19 0013    Lab Status:  Final result Specimen:  Blood from Arm, Right Updated:  01/03/19 0044     Troponin I 0 09 (H) ng/mL     Comprehensive metabolic panel [[id number]]  (Abnormal) Collected:  01/03/19 0013    Lab Status:  Final result Specimen:  Blood from Arm, Right Updated:  01/03/19 0043     Sodium 143 mmol/L      Potassium 4 3 mmol/L      Chloride 106 mmol/L      CO2 25 mmol/L      ANION GAP 12 mmol/L      BUN 41 (H) mg/dL      Creatinine 1 20 mg/dL      Glucose 323 (H) mg/dL      Calcium 8 7 mg/dL      AST 24 U/L      ALT 30 U/L      Alkaline Phosphatase 76 U/L      Total Protein 6 4 g/dL      Albumin 3 6 g/dL      Total Bilirubin 0 50 mg/dL      eGFR 40 ml/min/1 73sq m     Narrative:         National Kidney Disease Education Program recommendations are as follows:  GFR calculation is accurate only with a steady state creatinine  Chronic Kidney disease less than 60 ml/min/1 73 sq  meters  Kidney failure less than 15 ml/min/1 73 sq  meters  Lactic acid, plasma [[id number]]  (Abnormal) Collected:  01/02/19 2347    Lab Status:  Final result Specimen:  Blood from Arm, Right Updated:  01/03/19 0026     LACTIC ACID 4 6 (HH) mmol/L     Narrative:         Result may be elevated if tourniquet was used during collection      Ethanol [[id number]]  (Normal) Collected:  01/02/19 2347    Lab Status:  Final result Specimen:  Blood from Arm, Right Updated:  01/03/19 0025     Ethanol Lvl <3 mg/dL     Fingerstick Glucose (POCT) [[id number]]  (Abnormal) Collected:  01/03/19 0007    Lab Status:  Final result Updated:  01/03/19 0020     POC Glucose 288 (H) mg/dl     Ammonia [[id number]]  (Normal) Collected:  01/02/19 2347    Lab Status:  Final result Specimen:  Blood from Arm, Right Updated:  01/03/19 0014     Ammonia 14 umol/L     APTT [[id number]]  (Abnormal) Collected:  01/02/19 2347    Lab Status:  Final result Specimen:  Blood from Arm, Right Updated:  01/03/19 0014     PTT 22 (L) seconds     D-Dimer [[id number]]  (Abnormal) Collected:  01/02/19 2347    Lab Status:  Final result Specimen:  Blood from Arm, Right Updated:  01/03/19 0014     D-Dimer, Quant 3,559 (H) ng/ml (FEU)     Protime-INR [[id number]]  (Normal) Collected:  01/02/19 2347    Lab Status:  Final result Specimen:  Blood from Arm, Right Updated:  01/03/19 0014     Protime 13 8 seconds      INR 1 09    POCT Blood Gas (CG8+) [[id number]]  (Abnormal) Collected:  01/03/19 0003    Lab Status:  Final result Updated:  01/03/19 0009     pH, Art i-STAT 7 257 (L)     pCO2, Art i-STAT 49 8 (H) mm HG      pO2, ART i-STAT 152 0 (H) mm HG      BE, i-STAT -5 (L) mmol/L      HCO3, Art i-STAT 22 2 mmol/L      CO2, i-STAT 24 mmol/L      O2 Sat, i-STAT 99 (H) %      SODIUM, I-STAT 141 mmol/l      Potassium, i-STAT 3 8 mmol/L      Calcium, Ionized i-STAT 1 18 mmol/L      Hct, i-STAT 39 %      Hgb, i-STAT 13 3 g/dl      Glucose, i-STAT 341 (H) mg/dl      POC FIO2 70 L      Specimen Type ARTERIAL     Delivery System BIPAP     Respiratory Rate 12     Vent Value 000     Ancillary Values PEEPPS     Pressure Setting 05     SITE Left Radial     Additional Settings 010     PRISCILA TEST Postive Priscila Test    CBC and differential [[id number]]  (Abnormal) Collected:  01/02/19 2347    Lab Status:  Final result Specimen:  Blood from Arm, Right Updated:  01/03/19 0000     WBC 13 11 (H) Thousand/uL      RBC 4 25 Million/uL      Hemoglobin 13 3 g/dL      Hematocrit 43 2 %       (H) fL      MCH 31 3 pg      MCHC 30 8 (L) g/dL      RDW 12 9 %      MPV 9 3 fL      Platelets 291 Thousands/uL      nRBC 0 /100 WBCs      Neutrophils Relative 75 %      Immat GRANS % 1 %      Lymphocytes Relative 20 %      Monocytes Relative 2 (L) %      Eosinophils Relative 1 %      Basophils Relative 1 %      Neutrophils Absolute 9 90 (H) Thousands/µL      Immature Grans Absolute 0 10 Thousand/uL      Lymphocytes Absolute 2 61 Thousands/µL      Monocytes Absolute 0 29 Thousand/µL      Eosinophils Absolute 0 15 Thousand/µL      Basophils Absolute 0 06 Thousands/µL     Blood culture #2 [[id number]] Collected:  01/02/19 2347    Lab Status: In process Specimen:  Blood from Arm, Right Updated:  01/02/19 2357    Blood gas, arterial [[id number]]     Lab Status:  No result Specimen:  Blood, Arterial                  PE Study with CT Abdomen and Pelvis with contrast   ED Interpretation by Arthur Roblero MD (01/03 0230)   FINDINGS:      CHEST      PULMONARY ARTERIAL TREE:  No pulmonary embolus is seen  LUNGS:  Patchy bilateral consolidative airspace disease noted   There is no tracheal or endobronchial lesion  PLEURA:  Unremarkable  HEART/AORTA:  Coronary artery and aortic atherosclerotic changes noted   No pericardial effusion   No thoracic aortic aneurysm  MEDIASTINUM AND FRANKLIN:  Unremarkable  CHEST WALL AND LOWER NECK:   Unremarkable  ABDOMEN      LIVER/BILIARY TREE:  One or more subcentimeter sharply circumscribed low-density hepatic lesion(s) are noted, too small to accurately characterize, but statistically most likely to represent subcentimeter hepatic cysts   No suspicious solid hepatic    lesion is identified   Hepatic contours are normal   No biliary dilatation  GALLBLADDER:  No calcified gallstones  No pericholecystic inflammatory change  SPLEEN:  Unremarkable  PANCREAS:  Unremarkable  ADRENAL GLANDS:  Unremarkable  KIDNEYS/URETERS:  Unremarkable  No hydronephrosis  STOMACH AND BOWEL:  Fluid-filled stomach         APPENDIX:  No findings to suggest appendicitis  ABDOMINOPELVIC CAVITY:  No ascites or free intraperitoneal air  No lymphadenopathy  VESSELS:  Atherosclerotic changes are present   No evidence of aneurysm  PELVIS      REPRODUCTIVE ORGANS:  Unremarkable for patient's age  URINARY BLADDER:  Unremarkable  ABDOMINAL WALL/INGUINAL REGIONS:  Unremarkable        OSSEOUS STRUCTURES:  No acute fracture or destructive osseous lesion  Impression:        Patchy bilateral consolidative airspace disease throughout the lungs, consistent with infectious process   Findings may be secondary to aspiration, given markedly fluid distended stomach         Findings discussed with Dr Severiano Dorado at 2:26 AM, 1/3/2019                     Workstation performed: [id number]         Final Result by Reji Isaacs MD (01/03 8931)      Patchy bilateral consolidative airspace disease throughout the lungs, consistent with infectious process  Findings may be secondary to aspiration, given markedly fluid distended stomach  Findings discussed with Dr Severiano Dorado at 2:26 AM, 1/3/2019                     Workstation performed: [id number]         CT head without contrast   ED Interpretation by Dorcas Ram MD (01/03 0207)   FINDINGS:      PARENCHYMA: Decreased attenuation is noted in periventricular and subcortical white matter demonstrating an appearance that is statistically most likely to represent moderate microangiopathic change   Chronic lacunar infarction(s) are noted in basal    ganglia  No CT signs of acute infarction   No intracranial mass, mass effect or midline shift   No acute parenchymal hemorrhage  VENTRICLES AND EXTRA-AXIAL SPACES:  Enlargement of ventricles and extra-axial CSF spaces, out of proportion to the patient's age most consistent with cerebral and cerebellar atrophy  VISUALIZED ORBITS AND PARANASAL SINUSES:  Unremarkable  CALVARIUM AND EXTRACRANIAL SOFT TISSUES:  Near-complete opacification of the left mastoid air cells is seen  Impression:        No acute intracranial abnormality         Near complete opacification of left mastoid air cells   Clinical correlation for mastoiditis is recommended                  Workstation performed: [id number]         Final Result by Julissa Pettit DO (01/03 0205)      No acute intracranial abnormality  Near complete opacification of left mastoid air cells    Clinical correlation for mastoiditis is recommended                  Workstation performed: [id number]         XR chest 1 view portable   ED Interpretation by Deep Lee MD (01/03 0002)   Scoliosis, ? RLL infiltrate read by me  Procedures  ECG 12 Lead Documentation  Date/Time: 1/2/2019 11:48 PM  Performed by: Nuris Luong  Authorized by: Nursi Luong     Indications / Diagnosis:  Hypoxia  ECG reviewed by me, the ED Provider: yes    Patient location:  ED  Previous ECG:     Previous ECG:  Unavailable  Quality:     Tracing quality:  Limited by artifact  Rate:     ECG rate:  112    ECG rate assessment: tachycardic    Rhythm:     Rhythm: sinus tachycardia    Ectopy:     Ectopy: none    QRS:     QRS axis:  Left  Conduction:     Conduction: abnormal      Abnormal conduction: LAFB    ST segments:     ST segments:  Non-specific  T waves:     T waves: non-specific             Phone Contacts  ED Phone Contact    ED Course  ED Course as of Jan 03 0421   Thu Jan 03, 2019   0042 Labs, CXR d/w patient and son and daughter, who came to the ED, with patient's permission  80 D/w Dr Veronica Lo for level 1 step down      0300 CT's d/w patient and family  Patient is not a DNR and does not have a living will  HEART Risk Score      Most Recent Value   History  1 Filed at: 01/03/2019 0054   ECG  0 Filed at: 01/03/2019 0054   Age  2 Filed at: 01/03/2019 0054   Risk Factors  0 Filed at: 01/03/2019 0054   Troponin  1 Filed at: 01/03/2019 0054   Heart Score Risk Calculator   History  1 Filed at: 01/03/2019 0054   ECG  0 Filed at: 01/03/2019 0054   Age  2 Filed at: 01/03/2019 0054   Risk Factors  0 Filed at: 01/03/2019 0054   Troponin  1 Filed at: 01/03/2019 0054   HEART Score  4 Filed at: 01/03/2019 0054   HEART Score  4 Filed at: 01/03/2019 0054                      Initial Sepsis Screening     Row Name 01/03/19 0054                Is the patient's history suggestive of a new or worsening infection?  No -AO        Suspected source of infection          Are two or more of the following signs & symptoms of infection both present and new to the patient?         Indicate SIRS criteria          If the answer is yes to both questions, suspicion of sepsis is present          If severe sepsis is present AND tissue hypoperfusion perists in the hour after fluid resuscitation or lactate > 4, the patient meets criteria for SEPTIC SHOCK          Are any of the following organ dysfunction criteria present within 6 hours of suspected infection and SIRS criteria that are NOT considered to be chronic conditions?         Organ dysfunction          Date of presentation of severe sepsis          Time of presentation of severe sepsis          Tissue hypoperfusion persists in the hour after crystalloid fluid administration, evidenced, by either:          Was hypotension present within one hour of the conclusion of crystalloid fluid administration?         Date of presentation of septic shock          Time of presentation of septic shock            User Key  (r) = Recorded By, (t) = Taken By, (c) = Cosigned By    [address] Name Provider Fabiola Freeman MD Physician                  MDM  Number of Diagnoses or Management Options  Diagnosis management comments: DDX including but not limited to: pneumonia, aspiration pneumonitis, pleural effusion, CHF, PE, PTX, ACS, MI, asthma exacerbation, COPD exacerbation, anemia, metabolic abnormality, renal failure  DDx including but not limited to: food poisoning, viral illness, metabolic abnormality, dehydration, intracranial process, GI etiology, UTI, adverse reaction; doubt acute surgical intraabdominal process, Boorhave's syndrome, mediastinitis          Amount and/or Complexity of Data Reviewed  Clinical lab tests: ordered and reviewed  Tests in the radiology section of CPT®: ordered and reviewed  Decide to obtain previous medical records or to obtain history from someone other than the patient: yes  Obtain history from someone other than the patient: yes  Independent visualization of images, tracings, or specimens: yes      The patient presented with a condition in which there was a high probability of imminent or life-threatening deterioration, and critical care services (excluding separately billable procedures) totalled 30-74 minutes  Disposition  Final diagnoses:   Aspiration pneumonia (HCC)   Hypoxia   Vomiting   Dehydration   Elevated troponin   Elevated lactic acid level   Syncope     Time reflects when diagnosis was documented in both MDM as applicable and the Disposition within this note     Time User Action Codes Description Comment    1/3/2019  2:58 AM Pamela Nett Add [J69 0] Aspiration pneumonia ([address] )     1/3/2019  2:58 AM Pamela Nett Add [R09 02] Hypoxia     1/3/2019  2:58 AM Pamela Nett Add [R11 10] Vomiting     1/3/2019  2:58 AM Pamela Nett Add [E86 0] Dehydration     1/3/2019  2:58 AM Pamela Nett Add [R74 8] Elevated troponin     1/3/2019  2:58 AM Pamela Nett Add [R79 89] Elevated lactic acid level     1/3/2019  3:00 AM Pamela Nett Add [R55] Syncope     1/3/2019  3:49 AM Lopez Fill A Add [B35 1] Onychomycosis       ED Disposition     ED Disposition Condition Comment    Admit  Case was discussed with Dr Kerri Loomis and critical care [address] [place] [address] and the patient's admission status was agreed to be Admission Status: inpatient status to the service of Dr Kerri Loomis   Follow-up Information    None         Patient's Medications   Discharge Prescriptions    No medications on file     No discharge procedures on file      ED Provider  Electronically Signed by           Diandra Luong MD  01/03/19 8795       Diandra Luong MD  01/03/19 5503

## 2019-01-03 NOTE — ED NOTES
Pt transported to CT with RN, and respiratory therapist on cardiac monitor        Gerson Bell RN  01/03/19 5054

## 2019-01-03 NOTE — H&P
History and Physical - Critical Care   Sheila Glover 89 y o  female MRN: [id number]  Unit/Bed#: ED 10 Encounter: [id number]    Reason for Admission / Chief Complaint:  Hypoxic, unresponsive and agonal breathing    History of Present Illness:  Sheila Cheema is a 80 y o  female who presents to the ER after being found at home unresponsive with agonal breathing  Per family and patient she started with episodes of vomiting yesterday evening  During 1 such episode she was in the bathroom vomiting and when family went to check on her she was unresponsive leaning against the toilet and agonal breathing  When EMS arrived her SpO2 was 50% on room air, she was placed on CPAP with improvement to 90%  In the ER she was placed on BiPAP  CT scan chest abdomen pelvis head was done:  No acute intracranial abnormalities seen; no PE, patchy bilateral consolidations and markedly fluid distended stomach  Blood cultures were obtained and she was given Zosyn and vancomycin  Pertinent labs: Lactic acid of 4 6, troponin 0 09, white count 13 11, creatinine 1 2, blood sugar of 323, and D-dimer of 3559  Patient was given a L of fluid in the ER and lactic acid improved to 3 1  She was also given Zofran and Tylenol for fever of 100 4  Patient does not regularly see a primary care, has not seen a PCP and 15 years  She has a medical history of skin cancer which was removed surgically 15 years ago  She only takes vitamins at home  Family does report that they have noticed over last few years her mental status has been declining, she asked repeating questions and she asks where her  is who  25 years ago  History obtained from chart review, the patient and daughter: Shira Castle  Past Medical History:  Past Medical History:   Diagnosis Date    Cancer [facility])     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          Past Surgical History:  Past Surgical History:   Procedure Laterality Date    OTHER SURGICAL HISTORY      Pt had surgery on head for skin cancer  Past Family History:  History reviewed  No pertinent family history  Social History:  History   Smoking Status    Former Smoker    Quit date: 1/3/1950   Smokeless Tobacco    Never Used     History   Alcohol Use No     History   Drug Use No     Marital Status:       Medications:  Current Facility-Administered Medications   Medication Dose Route Frequency    chlorhexidine (PERIDEX) 0 12 % oral rinse 15 mL  15 mL Swish & Spit Q12H [address]    heparin (porcine) subcutaneous injection 5,000 Units  5,000 Units Subcutaneous Q8H [address]    piperacillin-tazobactam (ZOSYN) 3 375 g in sodium chloride 0 9 % 50 mL IVPB  3 375 g Intravenous Q6H    sodium chloride 0 9 % bolus 500 mL  500 mL Intravenous Once    sodium chloride 0 9 % infusion  125 mL/hr Intravenous Continuous     Home medications:  Prior to Admission medications    Medication Sig Start Date End Date Taking? Authorizing Provider   Ascorbic Acid (VITAMIN C) 100 MG tablet Take 100 mg by mouth daily   Yes Historical Provider, MD   multivitamin (THERAGRAN) TABS Take 1 tablet by mouth daily   Yes Historical Provider, MD   vitamin E 100 UNIT capsule Take 180 Units by mouth daily   Yes Historical Provider, MD     Allergies:  No Known Allergies    ROS:  Difficult to obtain full ROS due to respiratory status  Review of Systems   Gastrointestinal: Positive for nausea and vomiting  Negative for diarrhea  Psychiatric/Behavioral: Positive for confusion         Vitals:  Vitals:    19 0200 19 0230 19 0300 19 0330   BP:  144/86 149/82 114/67   BP Location:   Left arm Right arm   Pulse: (!) 114 (!) 110 (!) 114 104   Resp:   (!) 32 (!) 36   Temp: 99 7 °F (37 6 °C) 100 4 °F (38 °C) 100 4 °F (38 °C) 100 °F (37 8 °C)   TempSrc:    Tympanic Core   SpO2: 98% 95% 96% 97%   Weight:         Temperature:   Temp (24hrs), Av 7 °F (37 6 °C), Min:98 4 °F (36 9 °C), Max:100 4 °F (38 °C)    Current Temperature: 100 °F (37 8 °C)    Weights:   IBW: -92 5 kg  There is no height or weight on file to calculate BMI  Non-Invasive/Invasive Ventilation Settings:  Respiratory    Lab Data (Last 4 hours)    None         O2/Vent Data (Last 4 hours)      01/03 0150          Non-Invasive Ventilation Mode BiPAP                 SpO2: SpO2: 97 %     Physical Exam:  Physical Exam   Constitutional: She appears distressed  Acute chronically ill appearing elderly female sitting up in bed on BiPAP   HENT:   Head: Normocephalic  Large circular scar on the top of her head from an old skin cancer removal, scar well healed   Eyes: Pupils are equal, round, and reactive to light  EOM are normal    Neck: Normal range of motion  Neck supple  No tracheal deviation present  Cardiovascular: Normal heart sounds and intact distal pulses  Exam reveals no gallop and no friction rub  No murmur heard  Heart rate 113 beats per minute on exam   Pulmonary/Chest: She is in respiratory distress  She has no wheezes  On BiPAP SpO2 97%, lung sounds with rhonchi bilateral    Abdominal: Soft  Bowel sounds are normal  She exhibits no distension  There is no tenderness  Musculoskeletal: She exhibits no edema or deformity  Neurological: She is alert  Oriented x3 with periods of confusion and repeating questions  Following commands with all extremities, pupils equal and reactive bilateral   Moves all extremities equally   Skin: Skin is dry     Dry flaky lower extremities       Labs:    Results from last 7 days  Lab Units 01/03/19  0003 01/02/19  2347   WBC Thousand/uL  --  13 11*   HEMOGLOBIN g/dL  --  13 3   I STAT HEMOGLOBIN g/dl 13 3  --    HEMATOCRIT %  --  43 2   HEMATOCRIT, ISTAT % 39  --    PLATELETS Thousands/uL  --  233   NEUTROS PCT %  --  75   MONOS PCT %  --  2*       Results from last 7 days  Lab Units 01/03/19  0013 01/03/19  0003   SODIUM mmol/L 143  --    POTASSIUM mmol/L 4 3  --    CHLORIDE mmol/L 106  --    CO2 mmol/L 25  --    CO2, I-STAT mmol/L  --  24   ANION GAP mmol/L 12  --    BUN mg/dL 41*  --    CREATININE mg/dL 1 20  --    CALCIUM mg/dL 8 7  --    ALT U/L 30  --    AST U/L 24  --    ALK PHOS U/L 76  --    ALBUMIN g/dL 3 6  --    TOTAL BILIRUBIN mg/dL 0 50  --             Results from last 7 days  Lab Units 19  2347   INR  1 09   PTT seconds 22*       Results from last 7 days  Lab Units 19  0013   TROPONIN I ng/mL 0 09*       Results from last 7 days  Lab Units 19  0158 19  2347   LACTIC ACID mmol/L 3 1* 4 6*       Imaging:  I have personally reviewed pertinent reports  EKG: This was personally reviewed by myself    ______________________________________________________________________    Assessment:   Principal Problem:    Severe sepsis ([address] )  Active Problems:    Acute respiratory failure with hypoxia (HCC)    Aspiration pneumonia (HCC)    Lactic acidosis    Elevated troponin    Hyperglycemia    Onychomycosis of toenail      Plan:      Neuro:  Starting routine neuro checks and delirium precautions  Per family over last 2 years patient has declined in mental status, they report that she was repeating questions and also asks where her late  is who  25 years ago  Possibly some underlying dementia, consider Neurology consult versus follow-up as an outpatient  CV:  No cardiac history, troponin elevated on admission, will trend EKG with no ST changes  Suspect elevated troponin non STEMI type 2 secondary to pneumonia  Pulm:  Acute hypoxic respiratory failure, tolerating BiPAP currently  Will try to wean to nasal cannula and wean oxygen for SpO2 greater than 90%  CT scan showing bilateral pneumonia likely aspiration in the setting of vomiting at home and vomiting while on BiPAP  Continue empiric antibiotics  Will start Xopenex p r n  GI:  NPO for now due to respiratory status  :  Creatinine on admission is 1 2, there is no prior labs to compare with  On CT scan there was no hydronephrosis seen    She received a L of normal saline in the ER  Trend renal function and electrolytes daily, replace as needed     ID:  Severe sepsis, CT scan showing bilateral pneumonia likely aspiration in the setting of vomiting at home and with BiPAP on when EMS arrived  Blood cultures obtained in the ER, will try to obtain a sputum culture if able  Given vancomycin and Zosyn in the ER, will continue Zosyn q 6 hours at this time  Patient with no recent hospitalization or risk for MRSA, she lives at home with family  Continue trend lactic acid to cleared  Will obtain a procalcitonin  Heme:  Hemoglobin adequate, starting subcu heparin for DVT prophylaxis     Endo:  No history of diabetes, hyperglycemic on admission  Will start close monitoring of blood sugars with sliding scale insulin coverage, will obtain an A1c   TSH on admission 2 75     Msk/Skin:  Frequent repositioning to offload pressure points, will need physical therapy evaluation when medically stable  Podiatry consulted for Onychomyosis of toenails  Disposition:  Admit to step-down 1, level 1 code status per daughter    Counseling / Coordination of Care  Total Critical Care time spent 60 minutes excluding procedures, teaching and family updates  ______________________________________________________________________    VTE Pharmacologic Prophylaxis: Heparin  VTE Mechanical Prophylaxis: sequential compression device    Invasive lines and devices: Invasive Devices     Peripheral Intravenous Line            Peripheral IV 01/02/19 Left Hand less than 1 day    Peripheral IV 01/02/19 Right Forearm less than 1 day          Drain            Urethral Catheter Temperature probe 16 Fr  less than 1 day                Code Status: Level 1 - Full Code  POA:    POLST:      Given critical illness, patient length of stay will require greater than two midnights  Portions of the record may have been created with voice recognition software    Occasional wrong word or "sound a like" substitutions may have occurred due to the inherent limitations of voice recognition software  Read the chart carefully and recognize, using context, where substitutions have occurred        Carl Garay

## 2019-01-03 NOTE — PLAN OF CARE
DISCHARGE PLANNING     Discharge to home or other facility with appropriate resources Progressing        INFECTION - ADULT     Absence or prevention of progression during hospitalization Progressing        Knowledge Deficit     Patient/family/caregiver demonstrates understanding of disease process, treatment plan, medications, and discharge instructions Progressing        PAIN - ADULT     Verbalizes/displays adequate comfort level or baseline comfort level Progressing        Potential for Falls     Patient will remain free of falls Progressing        Prexisting or High Potential for Compromised Skin Integrity     Skin integrity is maintained or improved Progressing        RESPIRATORY - ADULT     Achieves optimal ventilation and oxygenation Progressing

## 2019-01-03 NOTE — PHYSICAL THERAPY NOTE
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 Help From Family   ADL Assistance Independent   IADLs Needs assistance   Falls in the last 6 months 0   Vocational Retired   Comments PTA, Pt  was INDEP with all ADLs, and functional mobility without an AD  Pt  receives assistance from family for IADL tasks and community acess at baseline  Per family, someone is home with her at all times   Restrictions/Precautions   Weight Bearing Precautions Per Order No   Other Precautions Droplet precautions; Chair Alarm; Bed Alarm;Cognitive;Multiple lines;Telemetry;O2;Fall Risk   General   Additional Pertinent History Pt  is a 81 yo F who presents to ER after being found unresponsive w/ agonal breathing  Per family patient began vomiting 01/02/18 and was found on toliet unresponsive with labored breathing   Dx: Severe Sepsis, Comorbidities include skin CA   Family/Caregiver Present Yes   Cognition   Overall Cognitive Status Impaired   Arousal/Participation Persistent stimuli required   Orientation Level Oriented to person;Disoriented to place; Disoriented to time;Disoriented to situation   Memory Decreased long term memory;Decreased recall of biographical information;Decreased short term memory;Decreased recall of recent events   Following Commands Follows one step commands inconsistently   Comments Pt  was identified with full name and birthdate via ID bracelet   RUE Assessment   RUE Assessment WFL  (grossly 3+/5)   LUE Assessment   LUE Assessment WFL  (grossly 3+/5)   RLE Assessment   RLE Assessment WFL  (grossly 4-/5)   LLE Assessment   LLE Assessment WFL  (grossly 4-/5)   Coordination   Movements are Fluid and Coordinated 1   Sensation WFL   Light Touch   RLE Light Touch Grossly intact   LLE Light Touch Grossly intact   Bed Mobility   Additional Comments unable to assess Pt  OOB in recliner prior to PT Evaluation   Transfers   Sit to Stand 4  Minimal assistance   Additional items Assist x 1; Increased time required;Verbal cues  (for hand placement and sequencing)   Stand to Sit 5  Supervision   Additional items Impulsive;Verbal cues  (to reach back for controlled descent)   Ambulation/Elevation   Gait pattern Improper Weight shift; Forward Flexion;Narrow JERI; Decreased foot clearance;Shuffling; Short stride; Excessively slow; Redundant gait at times; Inconsistent nima   Gait Assistance 4  Minimal assist   Additional items Assist x 1;Verbal cues  (for AD management)   Assistive Device Rolling walker   Distance 150'x1   Balance   Static Sitting Fair   Dynamic Sitting Fair   Static Standing Fair -   Dynamic Standing Poor +   Ambulatory Poor +   Endurance Deficit   Endurance Deficit Yes   Endurance Deficit Description desaturation to 84% during ambulation on 6L/min via NC   Activity Tolerance   Activity Tolerance Treatment limited secondary to medical complications (Comment); Patient limited by fatigue   Medical Staff Made Aware Spoke to MD, and AP   Nurse Made Aware Spoke to RN, and CNA   Assessment   Prognosis Good   Problem List Decreased strength;Decreased endurance; Impaired balance;Decreased mobility; Decreased safety awareness   Assessment Pt  is a 79 yo F who presents to ER after being found unresponsive w/ agonal breathing  Per family patient began vomiting 01/02/18 and was found on toliet unresponsive with labored breathing  Dx: Severe Sepsis,order placed for PT eval and tx, w/ activity order of up w/ A  pt presents w/ comorbidities of  skin CA and personal factors of advanced age, living in 1 story house, inability to navigate community distances, inability to navigate level surfaces w/o external assistance, unable to perform dynamic tasks in community, decreased cognition, impulsivity, unable to perform physical activity, limited insight into impairments, inability to perform IADLs, inability to perform ADLs and inability to live alone  pt presents w/ weakness, decreased endurance, impaired cognition, impaired balance, gait deviations and fall risk  these impairments are evident in findings from physical examination (weakness), mobility assessment, and need for min assist w/ all phases of mobility when usually mobilizing independently, tolerance to only 150 feet of ambulation and need for cueing for mobility technique  pt needed input for task focus and mobility technique  pt is at risk for falls due to physical and safety awareness deficits  pt's clinical presentation is unstable/unpredictable (evident in need for assist w/ all phases of mobility when usually mobilizing independently, tolerance to only 150 feet of ambulation, declining oxygen saturation w/ low level of activity, need for supplemental oxygen in order to maintain oxygen saturation, need for input for mobility technique, need for input for task focus and mobility technique and need for input for mobility technique/safety)  pt needs inpatient PT tx to improve mobility deficits   discharge recommendation is for home w/ family support and home PT to reduce fall risk and maximize level of functional independence  Goals   Patient Goals to go home and read   STG Expiration Date 01/13/19   Short Term Goal #1 pt will: Increase bilateral LE strength 1/2 grade to facilitate independent mobility, Perform all bed mobility tasks independently to decrease fall risk factors, Perform all transfers independently to improve independence, Ambulate 250 ft  with least restrictive assistive device w/ supervision w/o LOB, Increase all balance 1/2 grade to decrease risk for falls and Improve Barthel Index score to 75 or greater to facilitate independence   Plan   Treatment/Interventions Functional transfer training;LE strengthening/ROM; Therapeutic exercise; Endurance training;Cognitive reorientation;Patient/family training;Equipment eval/education; Bed mobility;Gait training;Spoke to MD;Spoke to nursing;Spoke to case management;Spoke to advanced practitioner;Family   PT Frequency 5x/wk  (and 1x/weekend)   Recommendation   Recommendation Home with family support;Home PT   Equipment Recommended Walker   Barthel Index   Feeding 5   Bathing 0   Grooming Score 0   Dressing Score 5   Bladder Score 0   Bowels Score 10   Toilet Use Score 5   Transfers (Bed/Chair) Score 10   Mobility (Level Surface) Score 10   Stairs Score 5   Barthel Index Score 50     Skilled PT recommended while in hospital and upon DC to progress pt toward treatment goals       Josh Berger, PT 1/3/2019

## 2019-01-03 NOTE — UTILIZATION REVIEW
[address]  Utilization Review Department  Phone: [phone]; Fax [phone]  [email or web address]  ATTENTION: Please call with any questions or concerns to [phone]  and carefully listen to the prompts so that you are directed to the right person  Send all requests for admission clinical reviews, approved or denied determinations and any other requests to fax [phone]  All voicemails are confidential   Initial Clinical Review    Admission: Date/Time/Statement: inpatient 01/03/19 0300    Orders Placed This Encounter   Procedures    Inpatient Admission (expected length of stay for this patient is greater than two midnights)     Telemetry     Standing Status:   Standing     Number of Occurrences:   1     Order Specific Question:   Admitting Physician     Answer:   Arnoldo Friedman [[id number]]     Order Specific Question:   Level of Care     Answer:   Level 1 Stepdown [13]     Order Specific Question:   Bed request comments     Answer:   telemetry     Order Specific Question:   Estimated length of stay     Answer:   More than 2 Midnights     Order Specific Question:   Certification     Answer:   I certify that inpatient services are medically necessary for this patient for a duration of greater than two midnights  See H&P and MD Progress Notes for additional information about the patient's course of treatment  ED: Date/Time/Mode of Arrival:   ED Arrival Information     Expected Arrival Acuity Means of Arrival Escorted By Service Admission Type    - 1/2/2019 23:40 Emergent Ambulance [facility] EMS Critical Care/ICU Emergency    Arrival Complaint    respiratory distress         Chief Complaint:   Chief Complaint   Patient presents with    Respiratory Distress     Pt comes via EMs from home  Pt ambulated to the bathroom where she vomited in the toliet and found unresponsive on the floor  Pt was in the bathroom for 15 minutes before someone found her   Pt SpO2 was >50 with agonal breathing and RR of 6 when EMS arrived  EMS placed patient in CPAP  Pt vomited 2x for EMS  Pt SpO2 improved for EMS and pt woke up and became noncompliant and combative  Per EMS family states pt take no meds and is in perfect health  Pt reciceved 4mg of zofran from EMS and bs of 291   Vomiting       History of Illness: 80year old female presents via EMS from home with vomiting tonight and was found unresponsive by the toilet tonight with decreased respirations  SpO2=>50 with agonal breathing & RR=6 & placed on CPAP-Patient became more awake for paramedics and was reportedly combative  Patient unable to provide complete hx and needed my immediate attention  Patient was BVM'd in the field and then placed on CPAP   Patient vomited and aspirated as per medic    ED Vital Signs:   ED Triage Vitals   Temperature Pulse Respirations Blood Pressure SpO2   01/03/19 0001 01/02/19 2345 01/02/19 2345 01/02/19 2345 01/02/19 2345   98 4 °F (36 9 °C) (!) 118 (!) 32 (!) 217/105 97 %      Temp Source Heart Rate Source Patient Position - Orthostatic VS BP Location FiO2 (%)   01/03/19 0001 01/02/19 2345 01/02/19 2345 01/02/19 2345 01/02/19 2347   Axillary Monitor Sitting Left arm 70      Pain Score       01/03/19 0038       No Pain        Wt Readings from Last 1 Encounters:   01/03/19 44 5 kg (98 lb 1 7 oz)       Vital Signs (abnormal): 1/2/2019 -136 RESP 32 /105  1/03/2019 100 4 -114 /109    Abnormal Labs/Diagnostic Test Results:   1/2/2019 wbc 13 11, lactic acid 4 6, d-dimer 3,559, PTT 22, acetaminophen level<2, salicylate level<3,      1/3/2019  pH, Art i-STAT 7 257     pCO2, Art i-STAT 49 8     pO2, ART i-STAT 152 0     BE, i-STAT -5     HCO3, Art i-STAT 22 2    CO2, i-STAT 24    O2 Sat, i-STAT 99     BUN 41, glucose 323, trop 0 09, pro calcitonin 14 79; urinalysis 1/2 % glucose, small blood, 1+ protein, 4-10 RBC;   CT BRAIN:Near complete opacification of left mastoid air cells   Clinical correlation for mastoiditis is recommended  CT abdomen/pelvis:Patchy bilateral consolidative airspace disease throughout the lungs, consistent with infectious process   Findings may be secondary to aspiration, given markedly fluid distended stomach       ED Treatment:   Medication Administration from 01/02/2019 2340 to 01/03/2019 0725       Date/Time Order Dose Route Action Comments     01/03/2019 0016 ondansetron (ZOFRAN) injection 4 mg 4 mg Intravenous Given      01/03/2019 0114 sodium chloride 0 9 % bolus 500 mL 0 mL Intravenous Stopped      01/03/2019 0015 sodium chloride 0 9 % bolus 500 mL 500 mL Intravenous New Bag      01/03/2019 0318 sodium chloride 0 9 % infusion 125 mL/hr Intravenous New Bag      01/03/2019 0012  EMS REPLENISHMENT MED 0  Does not apply Given to EMS      01/03/2019 0013 nitroglycerin (NITRO-BID) 2 % TD ointment 0 5 inch 0 5 inch Topical Not Given Pt BP was rechecked before adminstration and was 153/98  Checked with provieder before administering medication and Dr Joselyn Jacobo said not to give it  Medication was never put on patient       01/03/2019 0319 piperacillin-tazobactam (ZOSYN) 3 375 g in sodium chloride 0 9 % 50 mL IVPB 0 g Intravenous Stopped      01/03/2019 0208 piperacillin-tazobactam (ZOSYN) 3 375 g in sodium chloride 0 9 % 50 mL IVPB 3 375 g Intravenous New Bag      01/03/2019 0318 vancomycin (VANCOCIN) IVPB (premix) 1,000 mg 0 mg Intravenous Stopped      01/03/2019 0212 vancomycin (VANCOCIN) IVPB (premix) 1,000 mg 1,000 mg Intravenous New Bag Given in L hand 22G IV      01/03/2019 0337 sodium chloride 0 9 % bolus 500 mL 0 mL Intravenous Stopped      01/03/2019 0119 sodium chloride 0 9 % bolus 500 mL 500 mL Intravenous New Bag      01/03/2019 0140 iodixanol (VISIPAQUE) 320 MG/ML injection 70 mL 70 mL Intravenous Given      01/03/2019 0424 sodium chloride 0 9 % bolus 500 mL 0 mL Intravenous Stopped      01/03/2019 0338 sodium chloride 0 9 % bolus 500 mL 500 mL Intravenous New Bag 01/03/2019 0300 acetaminophen (TYLENOL) rectal suppository 650 mg 650 mg Rectal Given Fever  01/03/2019 0614 heparin (porcine) subcutaneous injection 5,000 Units 5,000 Units Subcutaneous Given      01/03/2019 0721 piperacillin-tazobactam (ZOSYN) 3 375 g in sodium chloride 0 9 % 50 mL IVPB 3 375 g Intravenous Not Given      01/03/2019 0610 insulin lispro (HumaLOG) 100 units/mL subcutaneous injection 1-5 Units 1 Units Subcutaneous Not Given Pt blood glucose 144          Past Medical/Surgical History: Active Ambulatory Problems     Diagnosis Date Noted    No Active Ambulatory Problems       Past Medical History:   Diagnosis Date    Cancer [facility])        Admitting Diagnosis: Dehydration [E86 0]  Onychomycosis [B35 1]  Aspiration pneumonia ([address] ) [J69 0]  Syncope [R55]  Vomiting [R11 10]  Respiratory distress [R06 03]  Hypoxia [R09 02]  Elevated troponin [R74 8]  Elevated lactic acid level [R79 89]    Age/Sex: 80 y o  female    Assessment/Plan: 1/3/2019 attending MD:  · RESP:  Acute hypoxic respiratory failure / bilateral bacterial pneumonia, likely aspiration given multiple vomiting episodes  - continue broad-spectrum antibiotics  Titrate O2 for saturations above 88%  · CV:  NSTEMI type 2 secondary to sepsis  - follow troponin  Check EKG  · ID:  Sepsis due to pneumonia, present on admission  - continue antibiotics    Follow-up culture data    Admission Orders:  Cam ICU-assessment  Cardio-pulmonary monitoring   peripheral IV  BiPAP  Neuro checks Q 4 hr  Nursing dysphagia assessment  NPO  Inpatient to Podiatry    Scheduled Meds:   Current Facility-Administered Medications:  [START ON 1/4/2019] cefepime 1,000 mg Intravenous Q24H DISHA Boyle    chlorhexidine 15 mL Swish & Spit Q12H [address] DISHA Donaldson    heparin (porcine) 5,000 Units Subcutaneous Q12H [address] DISHA [place]    influenza vaccine 0 5 mL Intramuscular Prior to discharge Jose Enrique Jo MD    insulin lispro 1-5 Units Subcutaneous Q6H [address] Marletta Jw, JOENP    levalbuterol 1 25 mg Nebulization Q4H PRN DISHA Chatman    metroNIDAZOLE 500 mg Intravenous Q8H DISHA Sethi Last Rate: 500 mg (01/03/19 0745)   multi-electrolyte 75 mL/hr Intravenous Continuous DISHA Sethi Last Rate: 75 mL/hr (01/03/19 0742)   ondansetron 4 mg Intravenous Q4H PRN DISHA Sethi    pneumococcal 13-valent conjugate vaccine 0 5 mL Intramuscular Prior to discharge Cassidy Jarrell MD    senna-docusate sodium 1 tablet Oral BID DISHA Sethi    [START ON 1/4/2019] vancomycin 10 mg/kg (Ideal) Intravenous Q24H DISHA Sethi      Continuous Infusions:   multi-electrolyte 75 mL/hr Last Rate: 75 mL/hr (01/03/19 0742)

## 2019-01-03 NOTE — PLAN OF CARE
Problem: PHYSICAL THERAPY ADULT  Goal: Performs mobility at highest level of function for planned discharge setting  See evaluation for individualized goals  Treatment/Interventions: Functional transfer training, LE strengthening/ROM, Therapeutic exercise, Endurance training, Cognitive reorientation, Patient/family training, Equipment eval/education, Bed mobility, Gait training, Spoke to MD, Spoke to nursing, Spoke to case management, Spoke to advanced practitioner, Family  Equipment Recommended: Darrius Albright       See flowsheet documentation for full assessment, interventions and recommendations  Prognosis: Good  Problem List: Decreased strength, Decreased endurance, Impaired balance, Decreased mobility, Decreased safety awareness  Assessment: Pt  is a 81 yo F who presents to ER after being found unresponsive w/ agonal breathing  Per family patient began vomiting 01/02/18 and was found on toliet unresponsive with labored breathing  Dx: Severe Sepsis,order placed for PT eval and tx, w/ activity order of up w/ A  pt presents w/ comorbidities of  skin CA and personal factors of advanced age, living in 1 story house, inability to navigate community distances, inability to navigate level surfaces w/o external assistance, unable to perform dynamic tasks in community, decreased cognition, impulsivity, unable to perform physical activity, limited insight into impairments, inability to perform IADLs, inability to perform ADLs and inability to live alone  pt presents w/ weakness, decreased endurance, impaired cognition, impaired balance, gait deviations and fall risk  these impairments are evident in findings from physical examination (weakness), mobility assessment, and need for min assist w/ all phases of mobility when usually mobilizing independently, tolerance to only 150 feet of ambulation and need for cueing for mobility technique  pt needed input for task focus and mobility technique   pt is at risk for falls due to physical and safety awareness deficits  pt's clinical presentation is unstable/unpredictable (evident in need for assist w/ all phases of mobility when usually mobilizing independently, tolerance to only 150 feet of ambulation, declining oxygen saturation w/ low level of activity, need for supplemental oxygen in order to maintain oxygen saturation, need for input for mobility technique, need for input for task focus and mobility technique and need for input for mobility technique/safety)  pt needs inpatient PT tx to improve mobility deficits  discharge recommendation is for home w/ family support and home PT to reduce fall risk and maximize level of functional independence  Recommendation: Home with family support, Home PT          See flowsheet documentation for full assessment       Hema Sotomayor, PT

## 2019-01-04 ENCOUNTER — APPOINTMENT (INPATIENT)
Dept: RADIOLOGY | Facility: HOSPITAL | Age: 84
DRG: 871 | End: 2019-01-04
Payer: MEDICARE

## 2019-01-04 LAB
ANION GAP SERPL CALCULATED.3IONS-SCNC: 7 MMOL/L (ref 4–13)
ANION GAP SERPL CALCULATED.3IONS-SCNC: 8 MMOL/L (ref 4–13)
ATRIAL RATE: 84 BPM
ATRIAL RATE: 85 BPM
BASOPHILS # BLD MANUAL: 0 THOUSAND/UL (ref 0–0.1)
BASOPHILS NFR MAR MANUAL: 0 % (ref 0–1)
BUN SERPL-MCNC: 38 MG/DL (ref 5–25)
BUN SERPL-MCNC: 43 MG/DL (ref 5–25)
CALCIUM SERPL-MCNC: 8.2 MG/DL (ref 8.3–10.1)
CALCIUM SERPL-MCNC: 8.6 MG/DL (ref 8.3–10.1)
CHLORIDE SERPL-SCNC: 109 MMOL/L (ref 100–108)
CHLORIDE SERPL-SCNC: 112 MMOL/L (ref 100–108)
CO2 SERPL-SCNC: 25 MMOL/L (ref 21–32)
CO2 SERPL-SCNC: 27 MMOL/L (ref 21–32)
CREAT SERPL-MCNC: 0.92 MG/DL (ref 0.6–1.3)
CREAT SERPL-MCNC: 1.1 MG/DL (ref 0.6–1.3)
EOSINOPHIL # BLD MANUAL: 0 THOUSAND/UL (ref 0–0.4)
EOSINOPHIL NFR BLD MANUAL: 0 % (ref 0–6)
ERYTHROCYTE [DISTWIDTH] IN BLOOD BY AUTOMATED COUNT: 13.1 % (ref 11.6–15.1)
GFR SERPL CREATININE-BSD FRML MDRD: 44 ML/MIN/1.73SQ M
GFR SERPL CREATININE-BSD FRML MDRD: 55 ML/MIN/1.73SQ M
GLUCOSE SERPL-MCNC: 102 MG/DL (ref 65–140)
GLUCOSE SERPL-MCNC: 104 MG/DL (ref 65–140)
GLUCOSE SERPL-MCNC: 109 MG/DL (ref 65–140)
GLUCOSE SERPL-MCNC: 138 MG/DL (ref 65–140)
GLUCOSE SERPL-MCNC: 98 MG/DL (ref 65–140)
HCT VFR BLD AUTO: 36.2 % (ref 34.8–46.1)
HGB BLD-MCNC: 11.1 G/DL (ref 11.5–15.4)
LYMPHOCYTES # BLD AUTO: 1.71 THOUSAND/UL (ref 0.6–4.47)
LYMPHOCYTES # BLD AUTO: 13 % (ref 14–44)
MAGNESIUM SERPL-MCNC: 2 MG/DL (ref 1.6–2.6)
MAGNESIUM SERPL-MCNC: 2.1 MG/DL (ref 1.6–2.6)
MCH RBC QN AUTO: 30.7 PG (ref 26.8–34.3)
MCHC RBC AUTO-ENTMCNC: 30.7 G/DL (ref 31.4–37.4)
MCV RBC AUTO: 100 FL (ref 82–98)
MONOCYTES # BLD AUTO: 0.53 THOUSAND/UL (ref 0–1.22)
MONOCYTES NFR BLD: 4 % (ref 4–12)
MRSA NOSE QL CULT: NORMAL
NEUTROPHILS # BLD MANUAL: 10.92 THOUSAND/UL (ref 1.85–7.62)
NEUTS BAND NFR BLD MANUAL: 16 % (ref 0–8)
NEUTS SEG NFR BLD AUTO: 67 % (ref 43–75)
NRBC BLD AUTO-RTO: 0 /100 WBCS
P AXIS: 51 DEGREES
P AXIS: 55 DEGREES
PHOSPHATE SERPL-MCNC: 2.3 MG/DL (ref 2.3–4.1)
PHOSPHATE SERPL-MCNC: 2.4 MG/DL (ref 2.3–4.1)
PLATELET # BLD AUTO: 153 THOUSANDS/UL (ref 149–390)
PLATELET BLD QL SMEAR: ADEQUATE
PMV BLD AUTO: 9.3 FL (ref 8.9–12.7)
POIKILOCYTOSIS BLD QL SMEAR: PRESENT
POTASSIUM SERPL-SCNC: 3.7 MMOL/L (ref 3.5–5.3)
POTASSIUM SERPL-SCNC: 4.2 MMOL/L (ref 3.5–5.3)
PR INTERVAL: 140 MS
PR INTERVAL: 148 MS
PROCALCITONIN SERPL-MCNC: 29.31 NG/ML
QRS AXIS: -53 DEGREES
QRS AXIS: -53 DEGREES
QRSD INTERVAL: 92 MS
QRSD INTERVAL: 92 MS
QT INTERVAL: 394 MS
QT INTERVAL: 396 MS
QTC INTERVAL: 467 MS
QTC INTERVAL: 468 MS
RBC # BLD AUTO: 3.61 MILLION/UL (ref 3.81–5.12)
SODIUM SERPL-SCNC: 144 MMOL/L (ref 136–145)
SODIUM SERPL-SCNC: 144 MMOL/L (ref 136–145)
T WAVE AXIS: 10 DEGREES
T WAVE AXIS: 7 DEGREES
TOTAL CELLS COUNTED SPEC: 100
TROPONIN I SERPL-MCNC: 1.25 NG/ML
TROPONIN I SERPL-MCNC: 1.29 NG/ML
VENTRICULAR RATE: 84 BPM
VENTRICULAR RATE: 85 BPM
WBC # BLD AUTO: 13.16 THOUSAND/UL (ref 4.31–10.16)

## 2019-01-04 PROCEDURE — 80048 BASIC METABOLIC PNL TOTAL CA: CPT | Performed by: INTERNAL MEDICINE

## 2019-01-04 PROCEDURE — 84484 ASSAY OF TROPONIN QUANT: CPT | Performed by: NURSE PRACTITIONER

## 2019-01-04 PROCEDURE — 84145 PROCALCITONIN (PCT): CPT | Performed by: INTERNAL MEDICINE

## 2019-01-04 PROCEDURE — 93010 ELECTROCARDIOGRAM REPORT: CPT | Performed by: INTERNAL MEDICINE

## 2019-01-04 PROCEDURE — 84100 ASSAY OF PHOSPHORUS: CPT | Performed by: INTERNAL MEDICINE

## 2019-01-04 PROCEDURE — 90662 IIV NO PRSV INCREASED AG IM: CPT | Performed by: INTERNAL MEDICINE

## 2019-01-04 PROCEDURE — G0008 ADMIN INFLUENZA VIRUS VAC: HCPCS | Performed by: INTERNAL MEDICINE

## 2019-01-04 PROCEDURE — 84484 ASSAY OF TROPONIN QUANT: CPT | Performed by: PHYSICIAN ASSISTANT

## 2019-01-04 PROCEDURE — 97116 GAIT TRAINING THERAPY: CPT

## 2019-01-04 PROCEDURE — 82948 REAGENT STRIP/BLOOD GLUCOSE: CPT

## 2019-01-04 PROCEDURE — 83735 ASSAY OF MAGNESIUM: CPT | Performed by: INTERNAL MEDICINE

## 2019-01-04 PROCEDURE — 85027 COMPLETE CBC AUTOMATED: CPT | Performed by: INTERNAL MEDICINE

## 2019-01-04 PROCEDURE — 84100 ASSAY OF PHOSPHORUS: CPT | Performed by: NURSE PRACTITIONER

## 2019-01-04 PROCEDURE — 83735 ASSAY OF MAGNESIUM: CPT | Performed by: NURSE PRACTITIONER

## 2019-01-04 PROCEDURE — 85007 BL SMEAR W/DIFF WBC COUNT: CPT | Performed by: INTERNAL MEDICINE

## 2019-01-04 PROCEDURE — 80048 BASIC METABOLIC PNL TOTAL CA: CPT | Performed by: NURSE PRACTITIONER

## 2019-01-04 PROCEDURE — 99233 SBSQ HOSP IP/OBS HIGH 50: CPT | Performed by: INTERNAL MEDICINE

## 2019-01-04 PROCEDURE — 71045 X-RAY EXAM CHEST 1 VIEW: CPT

## 2019-01-04 PROCEDURE — 97110 THERAPEUTIC EXERCISES: CPT

## 2019-01-04 RX ORDER — LANOLIN ALCOHOL/MO/W.PET/CERES
3 CREAM (GRAM) TOPICAL
Status: DISCONTINUED | OUTPATIENT
Start: 2019-01-04 | End: 2019-01-10 | Stop reason: HOSPADM

## 2019-01-04 RX ORDER — FUROSEMIDE 10 MG/ML
20 INJECTION INTRAMUSCULAR; INTRAVENOUS ONCE
Status: COMPLETED | OUTPATIENT
Start: 2019-01-04 | End: 2019-01-04

## 2019-01-04 RX ADMIN — METRONIDAZOLE 500 MG: 500 INJECTION, SOLUTION INTRAVENOUS at 14:53

## 2019-01-04 RX ADMIN — HEPARIN SODIUM 5000 UNITS: 5000 INJECTION, SOLUTION INTRAVENOUS; SUBCUTANEOUS at 21:55

## 2019-01-04 RX ADMIN — SENNOSIDES AND DOCUSATE SODIUM 1 TABLET: 8.6; 5 TABLET ORAL at 08:16

## 2019-01-04 RX ADMIN — HEPARIN SODIUM 5000 UNITS: 5000 INJECTION, SOLUTION INTRAVENOUS; SUBCUTANEOUS at 08:16

## 2019-01-04 RX ADMIN — VANCOMYCIN HYDROCHLORIDE 500 MG: 500 INJECTION, SOLUTION INTRAVENOUS at 03:27

## 2019-01-04 RX ADMIN — METRONIDAZOLE 500 MG: 500 INJECTION, SOLUTION INTRAVENOUS at 05:22

## 2019-01-04 RX ADMIN — METRONIDAZOLE 500 MG: 500 INJECTION, SOLUTION INTRAVENOUS at 22:15

## 2019-01-04 RX ADMIN — CEFEPIME HYDROCHLORIDE 1000 MG: 1 INJECTION, POWDER, FOR SOLUTION INTRAMUSCULAR; INTRAVENOUS at 09:20

## 2019-01-04 RX ADMIN — CHLORHEXIDINE GLUCONATE 0.12% ORAL RINSE 15 ML: 1.2 LIQUID ORAL at 08:16

## 2019-01-04 RX ADMIN — INFLUENZA A VIRUS A/MICHIGAN/45/2015 X-275 (H1N1) ANTIGEN (FORMALDEHYDE INACTIVATED), INFLUENZA A VIRUS A/SINGAPORE/INFIMH-16-0019/2016 IVR-186 (H3N2) ANTIGEN (FORMALDEHYDE INACTIVATED), AND INFLUENZA B VIRUS B/MARYLAND/15/2016 BX-69A (A B/COLORADO/6/2017-LIKE VIRUS) ANTIGEN (FORMALDEHYDE INACTIVATED) 0.5 ML: 60; 60; 60 INJECTION, SUSPENSION INTRAMUSCULAR at 18:29

## 2019-01-04 RX ADMIN — SENNOSIDES AND DOCUSATE SODIUM 1 TABLET: 8.6; 5 TABLET ORAL at 18:29

## 2019-01-04 RX ADMIN — FUROSEMIDE 20 MG: 10 INJECTION, SOLUTION INTRAMUSCULAR; INTRAVENOUS at 10:53

## 2019-01-04 NOTE — CONSULTS
Consult - Podiatry   Sheila Glover 89 y o  female MRN: [id number]  Unit/Bed#:  Encounter: [id number]    Assessment/Plan     Assessment:  1  Severe onychomycosis  2  Pain in limb  3  Xerosis cutis bilateral lower extremity    Plan:  Using a nail Nipper nails were debrided in length and thickness to remove all nonviable nail tissue  Patient would benefit from an outpatient aggressive nail therapy plan once discharged  For now the nails are stable  She would also benefit from moisturizer foot to the skin  History of Present Illness     HPI:  Sheila Lancaster is a 80 y o  female who presents with overgrown toenails  Patient has been unable to trim them for quite some time  Neither her nor her family member bedside can remember her nails being trimmed any recent past   All the nails are severely overgrown and she is unable to wear shoes  They are becoming tender to palpation because they get caught on socks and furniture  She denies any other concerns with her feet other than dry skin       Consults  Review of Systems   Constitutional: Negative  Musculoskeletal:  General weakness   Skin:  Thick painful toenails   Neurological: Negative  Psych: negative      Historical Information   Past Medical History:   Diagnosis Date    [facility] )     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  Past Surgical History:   Procedure Laterality Date    OTHER SURGICAL HISTORY      Pt had surgery on head for skin cancer  Social History   History   Alcohol Use No     History   Drug Use No     History   Smoking Status    Former Smoker    Quit date: 1/3/1950   Smokeless Tobacco    Never Used     Family History: History reviewed  No pertinent family history      Meds/Allergies   Prescriptions Prior to Admission   Medication    Ascorbic Acid (VITAMIN C) 100 MG tablet    multivitamin (THERAGRAN) TABS    vitamin E 100 UNIT capsule     No Known Allergies    Objective   First Vitals:   Blood Pressure: (!) 217/105 (01/02/19 2345)  Pulse: (!) 118 (01/02/19 2345)  Temperature: 98 4 °F (36 9 °C) (01/03/19 0001)  Temp Source: Axillary (01/03/19 0001)  Respirations: (!) 32 (01/02/19 2345)  Height: 5' 1" (154 9 cm) (01/03/19 0747)  Weight - Scale: 47 7 kg (105 lb 2 6 oz) (01/03/19 0033)  SpO2: 97 % (01/02/19 2345)    Current Vitals:   Blood Pressure: 166/92 (01/04/19 1100)  Pulse: 92 (01/04/19 1100)  Temperature: 99 7 °F (37 6 °C) (01/04/19 1100)  Temp Source: Probe (01/04/19 0700)  Respirations: 20 (01/04/19 1100)  Height: 5' 1" (154 9 cm) (01/03/19 1142)  Weight - Scale: 47 9 kg (105 lb 9 6 oz) (01/04/19 0600)  SpO2: 91 % (01/04/19 1100)        /92   Pulse 92   Temp 99 7 °F (37 6 °C)   Resp 20   Ht 5' 1" (1 549 m)   Wt 47 9 kg (105 lb 9 6 oz)   SpO2 91%   BMI 19 95 kg/m²   Physical Exam:  General:    Alert, cooperative, no distress                                                       Extremities:   Palpable pedal pulses  Skin is severely xerotic  Toes are warm to touch with brisk capillary refill  No calf pain  Manual muscle testing is 4-5  Gross sensation intact  Nails are severely overgrown mycotic with extensive amount of subungual debris  The nails are tender to pressure  No sign of any acute bacterial infection                       Lab Results:   Admission on 01/02/2019   Component Date Value    WBC 01/02/2019 13 11*    RBC 01/02/2019 4 25     Hemoglobin 01/02/2019 13 3     Hematocrit 01/02/2019 43 2     MCV 01/02/2019 102*    MCH 01/02/2019 31 3     MCHC 01/02/2019 30 8*    RDW 01/02/2019 12 9     MPV 01/02/2019 9 3     Platelets 74/14/7066 233     nRBC 01/02/2019 0     Neutrophils Relative 01/02/2019 75     Immat GRANS % 01/02/2019 1     Lymphocytes Relative 01/02/2019 20     Monocytes Relative 01/02/2019 2*    Eosinophils Relative 01/02/2019 1     Basophils Relative 01/02/2019 1     Neutrophils Absolute 01/02/2019 9 90*    Immature Grans Absolute 01/02/2019 0 10     Lymphocytes Absolute 01/02/2019 2 61  Monocytes Absolute 01/02/2019 0 29     Eosinophils Absolute 01/02/2019 0 15     Basophils Absolute 01/02/2019 0 06     Protime 01/02/2019 13 8     INR 01/02/2019 1 09     PTT 01/02/2019 22*    Blood Culture 01/03/2019 No Growth at 24 hrs   Blood Culture 01/02/2019 No Growth at 24 hrs       Sodium 01/03/2019 143     Potassium 01/03/2019 4 3     Chloride 01/03/2019 106     CO2 01/03/2019 25     ANION GAP 01/03/2019 12     BUN 01/03/2019 41*    Creatinine 01/03/2019 1 20     Glucose 01/03/2019 323*    Calcium 01/03/2019 8 7     AST 01/03/2019 24     ALT 01/03/2019 30     Alkaline Phosphatase 01/03/2019 76     Total Protein 01/03/2019 6 4     Albumin 01/03/2019 3 6     Total Bilirubin 01/03/2019 0 50     eGFR 01/03/2019 40     TSH 3RD GENERATON 01/03/2019 2 758     LACTIC ACID 01/02/2019 4 6*    Troponin I 01/03/2019 0 09*    D-Dimer, Quant 01/02/2019 3559*    NT-proBNP 01/03/2019 210     Ammonia 01/02/2019 14     Ethanol Lvl 39/13/1402 <3     Salicylate Lvl 76/68/6638 <3*    Acetaminophen Level 01/03/2019 <2*    pH, Art i-STAT 01/03/2019 7 257*    pCO2, Art i-STAT 01/03/2019 49 8*    pO2, ART i-STAT 01/03/2019 152 0*    BE, i-STAT 01/03/2019 -5*    HCO3, Art i-STAT 01/03/2019 22 2     CO2, i-STAT 01/03/2019 24     O2 Sat, i-STAT 01/03/2019 99*    SODIUM, I-STAT 01/03/2019 141     Potassium, i-STAT 01/03/2019 3 8     Calcium, Ionized i-STAT 01/03/2019 1 18     Hct, i-STAT 01/03/2019 39     Hgb, i-STAT 01/03/2019 13 3     Glucose, i-STAT 01/03/2019 341*    POC FIO2 01/03/2019 70     Specimen Type 01/03/2019 ARTERIAL     Delivery System 01/03/2019 BIPAP     Respiratory Rate 01/03/2019 12     Vent Value 01/03/2019 000     Ancillary Values 01/03/2019 PEEPPS     Pressure Setting 01/03/2019 05     SITE 01/03/2019 Left Radial     Additional Settings 01/03/2019 010     PRISCILA TEST 01/03/2019 Postive Priscila Test     POC Glucose 01/03/2019 288*    LACTIC ACID 01/03/2019 3 1*    Color, UA 01/03/2019 Yellow     Clarity, UA 01/03/2019 Clear     pH, UA 01/03/2019 6 0     Leukocytes, UA 01/03/2019 Negative     Nitrite, UA 01/03/2019 Negative     Protein, UA 01/03/2019 30 (1+)*    Glucose, UA 01/03/2019 500 (1/2%)*    Ketones, UA 01/03/2019 Negative     Urobilinogen, UA 01/03/2019 0 2     Bilirubin, UA 01/03/2019 Negative     Blood, UA 01/03/2019 Small*    Specific Flynn, UA 01/03/2019 1 025     RBC, UA 01/03/2019 4-10*    WBC, UA 01/03/2019 None Seen     Epithelial Cells 01/03/2019 None Seen     Bacteria, UA 01/03/2019 None Seen     LACTIC ACID 01/03/2019 2 0     Hemoglobin A1C 01/03/2019 5 1     EAG 01/03/2019 100     Troponin I 01/03/2019 0 37*    Procalcitonin 01/03/2019 14 79*    POC Glucose 01/03/2019 144*    Troponin I 01/03/2019 0 39*    POC Glucose 01/03/2019 136     Troponin I 01/03/2019 0 50*    INFLU A PCR 01/03/2019 None Detected     INFLU B PCR 01/03/2019 None Detected     RSV PCR 01/03/2019 None Detected     Troponin I 01/03/2019 0 78*    POC Glucose 01/03/2019 122     Troponin I 01/03/2019 1 02*    POC Glucose 01/03/2019 115     Troponin I 01/03/2019 1 28*    Total CK 01/03/2019 939*    Troponin I 01/03/2019 1 34*    CK-MB Index 01/03/2019 1 7     CK-MB 01/03/2019 15 6*    POC Glucose 01/03/2019 119     Troponin I 01/04/2019 1 29*    Sodium 01/04/2019 144     Potassium 01/04/2019 4 2     Chloride 01/04/2019 112*    CO2 01/04/2019 25     ANION GAP 01/04/2019 7     BUN 01/04/2019 43*    Creatinine 01/04/2019 0 92     Glucose 01/04/2019 102     Calcium 01/04/2019 8 2*    eGFR 01/04/2019 55     WBC 01/04/2019 13 16*    RBC 01/04/2019 3 61*    Hemoglobin 01/04/2019 11 1*    Hematocrit 01/04/2019 36 2     MCV 01/04/2019 100*    MCH 01/04/2019 30 7     MCHC 01/04/2019 30 7*    RDW 01/04/2019 13 1     MPV 01/04/2019 9 3     Platelets 94/59/3006 153     nRBC 01/04/2019 0     Magnesium 01/04/2019 2 0     Phosphorus 01/04/2019 2 4     Procalcitonin 01/04/2019 29 31*    Troponin I 01/04/2019 1 25*    Segmented % 01/04/2019 67     Bands % 01/04/2019 16*    Lymphocytes % 01/04/2019 13*    Monocytes % 01/04/2019 4     Eosinophils, % 01/04/2019 0     Basophils % 01/04/2019 0     Absolute Neutrophils 01/04/2019 10 92*    Lymphocytes Absolute 01/04/2019 1 71     Monocytes Absolute 01/04/2019 0 53     Eosinophils Absolute 01/04/2019 0 00     Basophils Absolute 01/04/2019 0 00     Total Counted 01/04/2019 100     Poikilocytes 01/04/2019 Present     Platelet Estimate 88/57/6551 Adequate     POC Glucose 01/04/2019 98     POC Glucose 01/04/2019 104              Results from last 7 days  Lab Units 01/03/19  0117 01/02/19  2347   BLOOD CULTURE  No Growth at 24 hrs  No Growth at 24 hrs  Invalid input(s): LABAEARO              Imaging:     EKG, Pathology, and Other Studies: I have personally reviewed pertinent reports  Code Status: Level 1 - Full Code  Advance Directive and Living Will:      Power of :    POLST:        Portions of the record may have been created with voice recognition software  Occasional wrong word or "sound a like" substitutions may have occurred due to the inherent limitations of voice recognition software  Read the chart carefully and recognize, using context, where substitutions have occurred

## 2019-01-04 NOTE — PLAN OF CARE
Problem: DISCHARGE PLANNING - CARE MANAGEMENT  Goal: Discharge to post-acute care or home with appropriate resources  INTERVENTIONS:  - Conduct assessment to determine patient/family and health care team treatment goals, and need for post-acute services based on payer coverage, community resources, and patient preferences, and barriers to discharge  - Address psychosocial, clinical, and financial barriers to discharge as identified in assessment in conjunction with the patient/family and health care team  - Arrange appropriate level of post-acute services according to patients   needs and preference and payer coverage in collaboration with the physician and health care team  - Communicate with and update the patient/family, physician, and health care team regarding progress on the discharge plan  - Arrange appropriate transportation to post-acute venues  Outcome: Progressing  LOS 1  Patient is not a bundle  Patient is not a readmission  CM met with patient at bedside  Also present were patient's daughter Kamila Moy and son Chelsea Yates  Patient lives with them in a single story home with 2 SUDHAKAR  Patient has no HX of of DME, VNA, or STR  Prior to admission patient was requiring some assistance with ADL's from her daughter  Patient does not have an established pharmacy per her son as she was not taking any medications  Patient does not have any mental health or substance abuse issues  Patient does not have a POA established and is not interested in information on this  Patient is transported by her family  CM reviewed PT recommendation of STR  STR list was provided  Family stated that son is home with patient all day long and at this point they would like to take her home with VNA and home therapy  They do not have a preference on VNA agency and are agreeable to a referral to [facility]  A referral will be made for PT, OT, SN, and HHA    CM explained to them that if patient is discharged from the hospital after Sunday to home and they find that they should have gone with STR instead of home they can call the facilities and look into direct admissions from home so they will be keeping the STR list provided to them  CM reviewed discharge planning process including the following: identifying caregivers at home, preference for d/c planning needs, [facility] Meds to Bed program, availability of treatment team to discuss questions or concerns patient and/or family may have regarding diagnosis, plan of care, old or new medications and discharge planning  CM name and role reviewed  CM will continue to follow for care coordination and update assessment as necessary

## 2019-01-04 NOTE — PHYSICAL THERAPY NOTE
PHYSICAL THERAPY TREATMENT NOTE    Patient Name: Torie Amaral  Today's Date: 1/4/2019 01/04/19 1045   Pain Assessment   Pain Assessment No/denies pain   Restrictions/Precautions   Other Precautions Chair Alarm; Bed Alarm;Cognitive;Multiple lines;Telemetry;O2;Fall Risk   General   Chart Reviewed Yes   Additional Pertinent History 3L oxygen via nasal cannula  resting pulse ox 95% and 83 BPM, active 90% and 99 BPM    Family/Caregiver Present Yes   Cognition   Overall Cognitive Status Impaired   Arousal/Participation Cooperative   Attention Attends with cues to redirect   Orientation Level Oriented to person;Disoriented to place; Disoriented to time; Other (Comment)  (pt was identified w/ full name, birth date)   Following Commands Follows one step commands with increased time or repetition   Subjective   Subjective pt seen sitting out of bed w/ son present  agreed to participate in PT intervention  denied pain  stated feeling tired  pt wants to rest    Transfers   Sit to Stand 3  Moderate assistance   Additional items Assist x 1; Increased time required; Impulsive;Verbal cues  (for hand placement, LE positioning)   Stand to Sit 3  Moderate assistance   Additional items Assist x 1; Impulsive;Verbal cues  (for body positioning, hand placement, controlled descent)   Ambulation/Elevation   Gait pattern Forward Flexion;Narrow JERI; Decreased foot clearance;Shuffling; Short stride; Excessively slow; Redundant gait at times; Inconsistent nima   Gait Assistance 3  Moderate assist  (w/ chair follow)   Additional items Assist x 1;Verbal cues; Tactile cues  (for device placement, posture, breathing technique, obstacle)   Assistive Device Rolling walker   Distance 15 feet, 20 feet, 45 feet  seated rest breaks x 3 to 4 minutes each    (additional not possible due to fatigue)   Balance   Static Sitting Fair   Dynamic Sitting Poor   Static Standing Poor + Dynamic Standing Poor   Ambulatory Poor  (w/ roller walker)   Activity Tolerance   Activity Tolerance Patient limited by fatigue   Nurse Made Aware spoke to April Mio MACIAS CM   Equipment Use   Comments ankle pumps 30  quad sets, heel slides, hip abduction, glut sets, and short arc quads 10 each  active assist straight leg raises 5  Assessment   Prognosis Fair   Problem List Decreased strength;Decreased endurance; Impaired balance;Decreased mobility; Decreased safety awareness;Decreased cognition   Assessment pt shows significant decline in mobility status since previous session  pt needs increased level of assist to maintain mobility safety and decreased ambulation tolerance  pt needed frequent rest breaks w/ activity  pt has poor safety awareness and needs repeated input for safety/technique  pt also needed input for breathing technique  pt is at risk for falling due to physical and safety awareness impairments pt needs continued inpatient PT to reduce fall risk factors  discharge recommendation is for inpatient rehab to maximize level of functional independence - home PT is no longer appropriate due to decrease in overall mobility status and safety  Goals   Patient Goals to rest   STG Expiration Date 01/13/19   Short Term Goal #1 pt will: Increase bilateral LE strength 1/2 grade to facilitate independent mobility, Perform all bed mobility tasks independently to decrease fall risk factors, Perform all transfers independently to improve independence, Ambulate 250 ft  with least restrictive assistive device w/ supervision w/o LOB, Increase all balance 1/2 grade to decrease risk for falls and Improve Barthel Index score to 75 or greater to facilitate independence   Treatment Day 1   Plan   Treatment/Interventions Functional transfer training;LE strengthening/ROM; Therapeutic exercise; Endurance training;Cognitive reorientation;Patient/family training;Equipment eval/education; Bed mobility;Gait training   Progress Slow progress, decreased activity tolerance   PT Frequency 5x/wk; Other (Comment)  (and 1x on weekend)   Recommendation   Recommendation Short-term skilled PT   Equipment Recommended Other (Comment)  (roller walker)     Skilled inpatient PT recommended while in hospital to progress pt toward treatment goals      Sofia Holden, PT

## 2019-01-04 NOTE — PROGRESS NOTES
Progress Note - Critical Care   Sheila Glover 89 y o  female MRN: [id number]  Unit/Bed#:  Encounter: [id number]    Attending Physician: Tanvir Sy MD      ______________________________________________________________________  Assessment and Plan:   Principal Problem:    Severe sepsis [facility])  Active Problems:    Acute respiratory failure with hypoxia ([facility] [address] )    Aspiration of gastric contents    NSTEMI (non-ST elevated myocardial infarction) ([facility] [address] )    Acute metabolic encephalopathy    Leukocytosis    Hyperglycemia    Onychomycosis of toenail  Resolved Problems:    Lactic acidosis      Plan:      Neuro:   Acute encephalopathy likely secondary to sepsis with underlying component of dementia  · Patient had increased confusion overnight  · Serial Neuro Exams  · Delirium monitoring/management  · Regulate Sleep-wake cycle/CAM-ICU daily    CV:   NSTEMI type 2 likely secondary to sepsis  · Cardiac infusions: none  · MAP goal > 65  · Rhythm: NSR  · Follow rhythm on telemetry  · Troponin trending down as evidence by: 1 28, 1 34, 1 29, 1 25    Lung:   Acute hypoxic respiratory failure secondary to community acquired pneumonia  · Likely aspiration of gastric contents given multiple vomiting episodes  · Wean O2 for SP02 > 88%  · Bipap prn  · Continue xopenex Q 4 hours prn  · Continue abx: cefepime, flagyl, vancomycin     GI:   · Bowel regimen: senokot s  · zofran prn    FEN:   · Fluids: Discontinued  · Net fluid balance for past 24 hours: +1L  · Net fluid balance since admission: +2 5 L  · Nutrition/diet plan: Diet ordered  · Replete electrolytes with goals: K >4 0, Mag >2 0, and Phos >3 0    :   · Indwelling Vernon present: yes  · Consider discontinuing vernon catheter for voiding trial  · Trend UOP and BUN/creat  · Monitor I and O    ID:   Sepsis secondary to pneumonia (POA)  · CXR with increase in right opactity  · Abx ordered: vancomycin, cefepime, and flagyl  · Continue until culture reports result  · Trend temps and WBC count  · Blood cultures with no growth in 24 hours  · MRSA culture pending  · Flu/PCR negative  · PCT increased: 14 79 to 29 31, consider rechecking PCT tomorrow    Heme:   · Trend hgb and plts  · Transfuse as needed for goal hgb >7    Endo:   · Glycemic control plan: controlled on current sliding scale  · Goal -180mg/dL    MSK/Skin:  · Early Mobility/Exercise  · PT consult: yes  · OT consult: yes  · Turn and position patient Q2 hours  · Off load pressure points  · Allevyn preventative per protocol  · Podiatry consulted    VTE Prophylaxis:  · Pharmacologic Prophylaxis: Heparin  · Mechanical Prophylaxis: sequential compression device    Disposition: Consider transfer to step down 2      Code Status: Level 1 - Full Code    ______________________________________________________________________    24 Hour Events: Increased confusion overnight  Review of Systems   Unable to perform ROS: Mental status change     ______________________________________________________________________    Physical Exam:   Physical Exam   Constitutional: No distress  HENT:   Head: Normocephalic and atraumatic  Mouth/Throat: Oropharynx is clear and moist    Eyes: Pupils are equal, round, and reactive to light  Conjunctivae are normal  No scleral icterus  Neck: Normal range of motion  Neck supple  No JVD present  Cardiovascular: Normal rate, regular rhythm, normal heart sounds and intact distal pulses  Exam reveals no gallop and no friction rub  No murmur heard  Pulmonary/Chest: Effort normal  No stridor  She has rales  She exhibits no tenderness  Abdominal: Soft  Bowel sounds are normal  She exhibits no distension and no mass  There is no tenderness  There is no rebound and no guarding  Genitourinary:   Genitourinary Comments: Albert to gravity drainage   Musculoskeletal: Normal range of motion  She exhibits no edema, tenderness or deformity  Lymphadenopathy:     She has no cervical adenopathy     Neurological: She is alert  She is disoriented  GCS eye subscore is 4  GCS verbal subscore is 4  GCS motor subscore is 6  Skin: Skin is warm and dry  No rash noted  She is not diaphoretic  No erythema        ______________________________________________________________________  Vitals:    19 0550 19 0600 19 0638 19 0700   BP:    (!) 126/106   BP Location:    Left arm   Pulse:   78 (!) 106   Resp:    18   Temp:   99 9 °F (37 7 °C) 100 °F (37 8 °C)   TempSrc:    Probe   SpO2: 94%  91% 96%   Weight:  47 9 kg (105 lb 9 6 oz)     Height:           Temperature:   Temp (24hrs), Av 6 °F (37 6 °C), Min:98 °F (36 7 °C), Max:100 4 °F (38 °C)    Current Temperature: 100 °F (37 8 °C)  Weights:   IBW: 47 8 kg    Body mass index is 19 95 kg/m²  Weight (last 2 days)     Date/Time   Weight    19 0600  47 9 (105 6)    19 0747  44 5 (98 11)    19 0033  47 7 (105 16)            Hemodynamic Monitoring:  N/A     Non-Invasive/Invasive Ventilation Settings:  Respiratory    Lab Data (Last 4 hours)    None         O2/Vent Data (Last 4 hours)    None              No results found for: PHART, KJH8FPN, PO2ART, DOM5UVU, X8GECCTG, BEART, SOURCE  SpO2: SpO2: 96 %  Intake and Outputs:  I/O       701 -  07 -  07 -  07    I V  (mL/kg)  1658 8 (34 6)     IV Piggyback 1800 450     Total Intake(mL/kg) 1800 (37 7) 2108 8 (44)     Urine (mL/kg/hr) 275 1090 (0 9)     Total Output 275 1090      Net +1525 +1018 8                 UOP: 0 9 ml/kg/hr   Nutrition:        Diet Orders            Start     Ordered    19 1441  Diet Cardiovascular; Cardiac Cath  Diet effective now     Question Answer Comment   Diet Type Cardiovascular    Cardiac Cardiac Cath    RD to adjust diet per protocol?  No        19 1440    19 0918  Room Service  Once     Question:  Type of Service  Answer:  Room Service - Appropriate with Assistance    19 0917          Labs:     Results from last 7 days  Lab Units 01/04/19  0326 01/03/19  0003 01/02/19  2347   WBC Thousand/uL 13 16*  --  13 11*   HEMOGLOBIN g/dL 11 1*  --  13 3   I STAT HEMOGLOBIN g/dl  --  13 3  --    HEMATOCRIT % 36 2  --  43 2   HEMATOCRIT, ISTAT %  --  39  --    PLATELETS Thousands/uL 153  --  233   NEUTROS PCT %  --   --  75   BANDS PCT % 16*  --   --    MONOS PCT %  --   --  2*   MONO PCT % 4  --   --        Results from last 7 days  Lab Units 01/04/19  0327 01/03/19  0013 01/03/19  0003   SODIUM mmol/L 144 143  --    POTASSIUM mmol/L 4 2 4 3  --    CHLORIDE mmol/L 112* 106  --    CO2 mmol/L 25 25  --    CO2, I-STAT mmol/L  --   --  24   ANION GAP mmol/L 7 12  --    BUN mg/dL 43* 41*  --    CREATININE mg/dL 0 92 1 20  --    CALCIUM mg/dL 8 2* 8 7  --    ALT U/L  --  30  --    AST U/L  --  24  --    ALK PHOS U/L  --  76  --    ALBUMIN g/dL  --  3 6  --    TOTAL BILIRUBIN mg/dL  --  0 50  --        Results from last 7 days  Lab Units 01/04/19  0327   MAGNESIUM mg/dL 2 0   PHOSPHORUS mg/dL 2 4        Results from last 7 days  Lab Units 01/02/19  2347   INR  1 09   PTT seconds 22*       Results from last 7 days  Lab Units 01/04/19  0527 01/04/19  0200 01/03/19  2240 01/03/19  1947 01/03/19  1642 01/03/19  1421 01/03/19  1132   TROPONIN I ng/mL 1 25* 1 29* 1 34* 1 28* 1 02* 0 78* 0 50*       Results from last 7 days  Lab Units 01/03/19  0424 01/03/19  0158 01/02/19  2347   LACTIC ACID mmol/L 2 0 3 1* 4 6*     ABG:No results found for: PHART, JIP9KJN, PO2ART, NHZ3YZV, P1TCADAK, BEART, SOURCE  VBG:      Results from last 7 days  Lab Units 01/04/19  0326 01/03/19  0502   PROCALCITONIN ng/ml 29 31* 14 79*     No results found for: [facility]   Imaging: CXR with slightly increased right sided airspace opacities compared to previous xray on 01/02 I have personally reviewed pertinent reports     and I have personally reviewed pertinent films in PACS  EKG: NSR on telemetry  Micro:    Results from last 7 days  Lab Units 01/03/19  1133 01/03/19  0117 01/02/19  2347   BLOOD CULTURE   --  No Growth at 24 hrs  No Growth at 24 hrs  INFLUENZA B PCR  None Detected  --   --    RSV PCR  None Detected  --   --      Allergies: No Known Allergies  Medications:   Scheduled Meds:  Current Facility-Administered Medications:  cefepime 1,000 mg Intravenous Q24H MagdaDISHA Madrid    chlorhexidine 15 mL Swish & Spit Q12H [facility] DISHA Segura    heparin (porcine) 5,000 Units Subcutaneous Q12H [facility] DISHA Garcia    influenza vaccine 0 5 mL Intramuscular Prior to discharge Debbie Chavez MD    insulin lispro 1-5 Units Subcutaneous TID With Meals Suzi Cabot, PA-C    levalbuterol 1 25 mg Nebulization Q4H PRN DISHA Segura    metroNIDAZOLE 500 mg Intravenous Q8H MagDISHA Barahona Last Rate: Stopped (01/04/19 0603)   multi-electrolyte 75 mL/hr Intravenous Continuous dapaulinoe DISHA Hamilton Last Rate: 75 mL/hr (01/03/19 2231)   ondansetron 4 mg Intravenous Q4H PRN MagdaDISHA Madrid    pneumococcal 13-valent conjugate vaccine 0 5 mL Intramuscular Prior to discharge Debbie Chavez MD    senna-docusate sodium 1 tablet Oral BID DISHA Garcia    vancomycin 10 mg/kg (Ideal) Intravenous Q24H Germania Hamilton, DISHA Last Rate: Stopped (01/04/19 0517)     Continuous Infusions:  multi-electrolyte 75 mL/hr Last Rate: 75 mL/hr (01/03/19 2231)     PRN Meds:    influenza vaccine 0 5 mL Prior to discharge   levalbuterol 1 25 mg Q4H PRN   ondansetron 4 mg Q4H PRN   pneumococcal 13-valent conjugate vaccine 0 5 mL Prior to discharge     VTE Pharmacologic Prophylaxis: Heparin  VTE Mechanical Prophylaxis: sequential compression device  Invasive lines and devices:   Invasive Devices     Peripheral Intravenous Line            Peripheral IV 01/03/19 Right;Upper Arm less than 1 day    Peripheral IV 01/04/19 Left Wrist less than 1 day          Drain            Urethral Catheter Temperature probe 16 Fr  1 day Portions of the record may have been created with voice recognition software  Occasional wrong word or "sound a like" substitutions may have occurred due to the inherent limitations of voice recognition software  Read the chart carefully and recognize, using context, where substitutions have occurred      DISHA Rios

## 2019-01-04 NOTE — SOCIAL WORK
LOS 1  Patient is not a bundle  Patient is not a readmission  CM met with patient at bedside  Also present were patient's daughter Kamila Moy and son Chelsea Yates  Patient lives with them in a single story home with 2 SUDHAKAR  Patient has no HX of of DME, VNA, or STR  Prior to admission patient was requiring some assistance with ADL's from her daughter  Patient does not have an established pharmacy per her son as she was not taking any medications  Patient does not have any mental health or substance abuse issues  Patient does not have a POA established and is not interested in information on this  Patient is transported by her family  CM reviewed PT recommendation of STR  STR list was provided  Family stated that son is home with patient all day long and at this point they would like to take her home with VNA and home therapy  They do not have a preference on VNA agency and are agreeable to a referral to [facility]  A referral will be made for PT, OT, SN, and HHA  CM explained to them that if patient is discharged from the hospital after Sunday to home and they find that they should have gone with STR instead of home they can call the facilities and look into direct admissions from home so they will be keeping the STR list provided to them  CM reviewed discharge planning process including the following: identifying caregivers at home, preference for d/c planning needs, [facility] Meds to Bed program, availability of treatment team to discuss questions or concerns patient and/or family may have regarding diagnosis, plan of care, old or new medications and discharge planning  CM name and role reviewed  CM will continue to follow for care coordination and update assessment as necessary

## 2019-01-04 NOTE — PLAN OF CARE
Problem: PHYSICAL THERAPY ADULT  Goal: Performs mobility at highest level of function for planned discharge setting  See evaluation for individualized goals  Treatment/Interventions: Functional transfer training, LE strengthening/ROM, Therapeutic exercise, Endurance training, Cognitive reorientation, Patient/family training, Equipment eval/education, Bed mobility, Gait training, Spoke to MD, Spoke to nursing, Spoke to case management, Spoke to advanced practitioner, Family  Equipment Recommended: Mayra Rhodes       See flowsheet documentation for full assessment, interventions and recommendations  Outcome: Not Progressing  Prognosis: Fair  Problem List: Decreased strength, Decreased endurance, Impaired balance, Decreased mobility, Decreased safety awareness, Decreased cognition  Assessment: pt shows significant decline in mobility status since previous session  pt needs increased level of assist to maintain mobility safety and decreased ambulation tolerance  pt needed frequent rest breaks w/ activity  pt has poor safety awareness and needs repeated input for safety/technique  pt also needed input for breathing technique  pt is at risk for falling due to physical and safety awareness impairments pt needs continued inpatient PT to reduce fall risk factors  discharge recommendation is for inpatient rehab to maximize level of functional independence - home PT is no longer appropriate due to decrease in overall mobility status and safety  Recommendation: Short-term skilled PT          See flowsheet documentation for full assessment

## 2019-01-04 NOTE — PROGRESS NOTES
Vancomycin IV Pharmacy-to-Dose Consultation    June Carlos Landaverde is a 80 y o  female who is currently receiving Vancomycin IV with management by the Pharmacy Consult service  Assessment/Plan:  The patient was reviewed  Renal function is improving and no signs or symptoms of nephrotoxicity and/or infusion reactions were documented in the chart  Based on todays assessment, will adjust dose from 10 mg/kg/dose Q24H (500 mg) to 15 mg/kg/dose Q24H (750 mg) based on renal function improvement  Will continue with plan for trough on Sunday 1/6/19 at 0300  MRSA culture is pending  If negative, will discuss vancomycin discontinuation with ICU team     We will continue to follow the patients culture results and clinical progress daily      Thao Sy, AmeeD

## 2019-01-04 NOTE — PHYSICIAN ADVISOR
Current patient class: Inpatient  The patient is currently on Hospital Day: 2 at [facility]      The patient was admitted to the hospital at 0300 on 1/3/19 for the following diagnosis:  Dehydration [E86 0]  Onychomycosis [B35 1]  Aspiration pneumonia ([address] ) [J69 0]  Syncope [R55]  Vomiting [R11 10]  Respiratory distress [R06 03]  Hypoxia [R09 02]  Elevated troponin [R74 8]  Elevated lactic acid level [R79 89]       There is documentation in the medical record of an expected length of stay of at least 2 midnights  The patient is therefore expected to satisfy the 2 midnight benchmark and given the 2 midnight presumption is appropriate for INPATIENT ADMISSION  Given this expectation of a satisfying stay, CMS instructs us that the patient is most often appropriate for inpatient admission under part A provided medical necessity is documented in the chart  After review of the relevant documentation, labs, vital signs and test results, the patient is appropriate for INPATIENT ADMISSION  Admission to the hospital as an inpatient is a complex decision making process which requires the practitioner to consider the patients presenting complaint, history and physical examination and all relevant testing  With this in mind, in this case, the patient was deemed appropriate for INPATIENT ADMISSION  After review of the documentation and testing available at the time of the admission I concur with this clinical determination of medical necessity  Rationale is as follows: The patient is a 80 yrs old Female who presented to the ED at 1/2/2019 11:40 PM with a chief complaint of Respiratory Distress (Pt comes via EMs from home  Pt ambulated to the bathroom where she vomited in the toliet and found unresponsive on the floor  Pt was in the bathroom for 15 minutes before someone found her  Pt SpO2 was >50 with agonal breathing and RR of 6 when EMS arrived  EMS placed patient in CPAP   Pt vomited 2x for EMS  Pt SpO2 improved for EMS and pt woke up and became noncompliant and combative  Per EMS family states pt take no meds and is in perfect health  Pt reciceved 4mg of zofran from EMS and bs of 291 ) and Vomiting     Given the need for further hospitalization, and along with the documentation of medical necessity present in the chart, the patient is appropriate for inpatient admission  The patient is expected to satisfy the 2 midnight benchmark, and will require further acute medical care  The patient does have comorbid conditions which increases the risk for significant adverse outcome  Given this the patient is appropriate for inpatient admission  The patients vitals on arrival were ED Triage Vitals   Temperature Pulse Respirations Blood Pressure SpO2   01/03/19 0001 01/02/19 2345 01/02/19 2345 01/02/19 2345 01/02/19 2345   98 4 °F (36 9 °C) (!) 118 (!) 32 (!) 217/105 97 %      Temp Source Heart Rate Source Patient Position - Orthostatic VS BP Location FiO2 (%)   01/03/19 0001 01/02/19 2345 01/02/19 2345 01/02/19 2345 01/02/19 2347   Axillary Monitor Sitting Left arm 70      Pain Score       01/03/19 0038       No Pain           Past Medical History:   Diagnosis Date    Cancer [facility])     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  Past Surgical History:   Procedure Laterality Date    OTHER SURGICAL HISTORY      Pt had surgery on head for skin cancer              Consults have been placed to:   IP CONSULT TO CASE MANAGEMENT  IP CONSULT TO NUTRITION SERVICES  IP CONSULT TO PODIATRY  IP CONSULT TO PHARMACY    Vitals:    01/03/19 1100 01/03/19 1142 01/03/19 1500 01/03/19 1900   BP: 139/82  130/72 130/70   BP Location: Left arm  Left arm Left arm   Pulse: 95  82 92   Resp: 18  18 22   Temp: 98 °F (36 7 °C)  99 2 °F (37 3 °C) 100 4 °F (38 °C)   TempSrc: Oral  Oral Probe   SpO2: 97%  92% 92%   Weight:       Height:  5' 1" (1 549 m)         Most recent labs:    Recent Labs      01/02/19   2347  01/03/19   0003  01/03/19 0013   01/03/19 1947   WBC  13 11*   --    --    --    --    HGB  13 3  13 3   --    --    --    HCT  43 2  39   --    --    --    PLT  233   --    --    --    --    K   --    --   4 3   --    --    CALCIUM   --    --   8 7   --    --    BUN   --    --   41*   --    --    CREATININE   --    --   1 20   --    --    INR  1 09   --    --    --    --    TROPONINI   --    --   0 09*   < >  1 28*   CKTOTAL   --    --    --    --   939*   AST   --    --   24   --    --    ALT   --    --   30   --    --    ALKPHOS   --    --   76   --    --     < > = values in this interval not displayed  Scheduled Meds:  Current Facility-Administered Medications:  [START ON 1/4/2019] cefepime 1,000 mg Intravenous Q24H Abraham ChristianoJOE waddellNP    chlorhexidine 15 mL Swish & Spit Q12H [address] Rohini Alamin, CRNP    heparin (porcine) 5,000 Units Subcutaneous Q12H [address] Abraham Christiano, JOENP    influenza vaccine 0 5 mL Intramuscular Prior to discharge Leonard Díaz MD    insulin lispro 1-5 Units Subcutaneous Q6H [address] Rohini AlaminDISHA    levalbuterol 1 25 mg Nebulization Q4H PRN Rohini WilbertminDISHA    metroNIDAZOLE 500 mg Intravenous Q8H Abraham Christiano, JOENP Last Rate: 500 mg (01/03/19 1450)   multi-electrolyte 75 mL/hr Intravenous Continuous Abraham Christiano, CRNP Last Rate: Stopped (01/03/19 1450)   ondansetron 4 mg Intravenous Q4H PRN Abraham Christiano, CRNP    pneumococcal 13-valent conjugate vaccine 0 5 mL Intramuscular Prior to discharge Leonard Díaz MD    senna-docusate sodium 1 tablet Oral BID DISHA Steven    [START ON 1/4/2019] vancomycin 10 mg/kg (Ideal) Intravenous Q24H Del Christiano, CRNP      Continuous Infusions:  multi-electrolyte 75 mL/hr Last Rate: Stopped (01/03/19 1450)     PRN Meds:  influenza vaccine    levalbuterol    ondansetron    pneumococcal 13-valent conjugate vaccine    Surgical procedures (if appropriate):

## 2019-01-05 ENCOUNTER — APPOINTMENT (INPATIENT)
Dept: RADIOLOGY | Facility: HOSPITAL | Age: 84
DRG: 871 | End: 2019-01-05
Payer: MEDICARE

## 2019-01-05 PROBLEM — E87.0 HYPERNATREMIA: Status: ACTIVE | Noted: 2019-01-05

## 2019-01-05 LAB
ANION GAP SERPL CALCULATED.3IONS-SCNC: 7 MMOL/L (ref 4–13)
ANION GAP SERPL CALCULATED.3IONS-SCNC: 8 MMOL/L (ref 4–13)
BASOPHILS # BLD MANUAL: 0 THOUSAND/UL (ref 0–0.1)
BASOPHILS NFR MAR MANUAL: 0 % (ref 0–1)
BUN SERPL-MCNC: 36 MG/DL (ref 5–25)
BUN SERPL-MCNC: 38 MG/DL (ref 5–25)
CALCIUM SERPL-MCNC: 8.1 MG/DL (ref 8.3–10.1)
CALCIUM SERPL-MCNC: 8.4 MG/DL (ref 8.3–10.1)
CHLORIDE SERPL-SCNC: 110 MMOL/L (ref 100–108)
CHLORIDE SERPL-SCNC: 112 MMOL/L (ref 100–108)
CO2 SERPL-SCNC: 27 MMOL/L (ref 21–32)
CO2 SERPL-SCNC: 29 MMOL/L (ref 21–32)
CREAT SERPL-MCNC: 0.81 MG/DL (ref 0.6–1.3)
CREAT SERPL-MCNC: 0.83 MG/DL (ref 0.6–1.3)
EOSINOPHIL # BLD MANUAL: 0 THOUSAND/UL (ref 0–0.4)
EOSINOPHIL NFR BLD MANUAL: 0 % (ref 0–6)
ERYTHROCYTE [DISTWIDTH] IN BLOOD BY AUTOMATED COUNT: 13.2 % (ref 11.6–15.1)
GFR SERPL CREATININE-BSD FRML MDRD: 62 ML/MIN/1.73SQ M
GFR SERPL CREATININE-BSD FRML MDRD: 64 ML/MIN/1.73SQ M
GLUCOSE SERPL-MCNC: 107 MG/DL (ref 65–140)
GLUCOSE SERPL-MCNC: 111 MG/DL (ref 65–140)
GLUCOSE SERPL-MCNC: 112 MG/DL (ref 65–140)
GLUCOSE SERPL-MCNC: 118 MG/DL (ref 65–140)
GLUCOSE SERPL-MCNC: 119 MG/DL (ref 65–140)
GLUCOSE SERPL-MCNC: 136 MG/DL (ref 65–140)
HCT VFR BLD AUTO: 32.7 % (ref 34.8–46.1)
HGB BLD-MCNC: 10.2 G/DL (ref 11.5–15.4)
LYMPHOCYTES # BLD AUTO: 0.52 THOUSAND/UL (ref 0.6–4.47)
LYMPHOCYTES # BLD AUTO: 5 % (ref 14–44)
MCH RBC QN AUTO: 30.4 PG (ref 26.8–34.3)
MCHC RBC AUTO-ENTMCNC: 31.2 G/DL (ref 31.4–37.4)
MCV RBC AUTO: 97 FL (ref 82–98)
MONOCYTES # BLD AUTO: 0.84 THOUSAND/UL (ref 0–1.22)
MONOCYTES NFR BLD: 8 % (ref 4–12)
NEUTROPHILS # BLD MANUAL: 9.1 THOUSAND/UL (ref 1.85–7.62)
NEUTS BAND NFR BLD MANUAL: 4 % (ref 0–8)
NEUTS SEG NFR BLD AUTO: 83 % (ref 43–75)
NRBC BLD AUTO-RTO: 0 /100 WBCS
PLATELET # BLD AUTO: 152 THOUSANDS/UL (ref 149–390)
PLATELET BLD QL SMEAR: ADEQUATE
PMV BLD AUTO: 9.3 FL (ref 8.9–12.7)
POIKILOCYTOSIS BLD QL SMEAR: PRESENT
POTASSIUM SERPL-SCNC: 3.4 MMOL/L (ref 3.5–5.3)
POTASSIUM SERPL-SCNC: 3.6 MMOL/L (ref 3.5–5.3)
PROCALCITONIN SERPL-MCNC: 20.91 NG/ML
RBC # BLD AUTO: 3.36 MILLION/UL (ref 3.81–5.12)
SCHISTOCYTES BLD QL SMEAR: PRESENT
SODIUM SERPL-SCNC: 146 MMOL/L (ref 136–145)
SODIUM SERPL-SCNC: 147 MMOL/L (ref 136–145)
TOTAL CELLS COUNTED SPEC: 100
WBC # BLD AUTO: 10.46 THOUSAND/UL (ref 4.31–10.16)

## 2019-01-05 PROCEDURE — 92526 ORAL FUNCTION THERAPY: CPT

## 2019-01-05 PROCEDURE — 99233 SBSQ HOSP IP/OBS HIGH 50: CPT | Performed by: INTERNAL MEDICINE

## 2019-01-05 PROCEDURE — 94660 CPAP INITIATION&MGMT: CPT

## 2019-01-05 PROCEDURE — 82948 REAGENT STRIP/BLOOD GLUCOSE: CPT

## 2019-01-05 PROCEDURE — 80048 BASIC METABOLIC PNL TOTAL CA: CPT | Performed by: INTERNAL MEDICINE

## 2019-01-05 PROCEDURE — 94760 N-INVAS EAR/PLS OXIMETRY 1: CPT

## 2019-01-05 PROCEDURE — 94640 AIRWAY INHALATION TREATMENT: CPT

## 2019-01-05 PROCEDURE — 85027 COMPLETE CBC AUTOMATED: CPT | Performed by: INTERNAL MEDICINE

## 2019-01-05 PROCEDURE — 84145 PROCALCITONIN (PCT): CPT | Performed by: INTERNAL MEDICINE

## 2019-01-05 PROCEDURE — 80048 BASIC METABOLIC PNL TOTAL CA: CPT | Performed by: PHYSICIAN ASSISTANT

## 2019-01-05 PROCEDURE — 85007 BL SMEAR W/DIFF WBC COUNT: CPT | Performed by: INTERNAL MEDICINE

## 2019-01-05 PROCEDURE — 71045 X-RAY EXAM CHEST 1 VIEW: CPT

## 2019-01-05 PROCEDURE — 92610 EVALUATE SWALLOWING FUNCTION: CPT

## 2019-01-05 RX ORDER — POTASSIUM CHLORIDE 20 MEQ/1
20 TABLET, EXTENDED RELEASE ORAL ONCE
Status: DISCONTINUED | OUTPATIENT
Start: 2019-01-05 | End: 2019-01-10 | Stop reason: HOSPADM

## 2019-01-05 RX ORDER — LEVALBUTEROL 1.25 MG/.5ML
1.25 SOLUTION, CONCENTRATE RESPIRATORY (INHALATION)
Status: DISCONTINUED | OUTPATIENT
Start: 2019-01-05 | End: 2019-01-10 | Stop reason: HOSPADM

## 2019-01-05 RX ORDER — GUAIFENESIN/DEXTROMETHORPHAN 100-10MG/5
10 SYRUP ORAL EVERY 4 HOURS PRN
Status: DISCONTINUED | OUTPATIENT
Start: 2019-01-05 | End: 2019-01-10 | Stop reason: HOSPADM

## 2019-01-05 RX ORDER — POTASSIUM CHLORIDE 20 MEQ/1
40 TABLET, EXTENDED RELEASE ORAL ONCE
Status: DISCONTINUED | OUTPATIENT
Start: 2019-01-05 | End: 2019-01-05

## 2019-01-05 RX ORDER — POTASSIUM CHLORIDE 14.9 MG/ML
20 INJECTION INTRAVENOUS ONCE
Status: COMPLETED | OUTPATIENT
Start: 2019-01-05 | End: 2019-01-05

## 2019-01-05 RX ORDER — SODIUM CHLORIDE FOR INHALATION 0.9 %
3 VIAL, NEBULIZER (ML) INHALATION
Status: DISCONTINUED | OUTPATIENT
Start: 2019-01-05 | End: 2019-01-10 | Stop reason: HOSPADM

## 2019-01-05 RX ORDER — BISACODYL 10 MG
10 SUPPOSITORY, RECTAL RECTAL DAILY PRN
Status: DISCONTINUED | OUTPATIENT
Start: 2019-01-05 | End: 2019-01-10 | Stop reason: HOSPADM

## 2019-01-05 RX ADMIN — VANCOMYCIN HYDROCHLORIDE 750 MG: 750 INJECTION, SOLUTION INTRAVENOUS at 02:59

## 2019-01-05 RX ADMIN — ISODIUM CHLORIDE 3 ML: 0.03 SOLUTION RESPIRATORY (INHALATION) at 13:32

## 2019-01-05 RX ADMIN — HEPARIN SODIUM 5000 UNITS: 5000 INJECTION, SOLUTION INTRAVENOUS; SUBCUTANEOUS at 21:31

## 2019-01-05 RX ADMIN — LEVALBUTEROL HYDROCHLORIDE 1.25 MG: 1.25 SOLUTION, CONCENTRATE RESPIRATORY (INHALATION) at 13:32

## 2019-01-05 RX ADMIN — ISODIUM CHLORIDE 3 ML: 0.03 SOLUTION RESPIRATORY (INHALATION) at 19:01

## 2019-01-05 RX ADMIN — LEVALBUTEROL HYDROCHLORIDE 1.25 MG: 1.25 SOLUTION, CONCENTRATE RESPIRATORY (INHALATION) at 09:15

## 2019-01-05 RX ADMIN — METRONIDAZOLE 500 MG: 500 INJECTION, SOLUTION INTRAVENOUS at 21:32

## 2019-01-05 RX ADMIN — METRONIDAZOLE 500 MG: 500 INJECTION, SOLUTION INTRAVENOUS at 15:16

## 2019-01-05 RX ADMIN — HEPARIN SODIUM 5000 UNITS: 5000 INJECTION, SOLUTION INTRAVENOUS; SUBCUTANEOUS at 08:40

## 2019-01-05 RX ADMIN — POTASSIUM CHLORIDE 20 MEQ: 200 INJECTION, SOLUTION INTRAVENOUS at 03:58

## 2019-01-05 RX ADMIN — METRONIDAZOLE 500 MG: 500 INJECTION, SOLUTION INTRAVENOUS at 05:47

## 2019-01-05 RX ADMIN — ISODIUM CHLORIDE 3 ML: 0.03 SOLUTION RESPIRATORY (INHALATION) at 09:15

## 2019-01-05 RX ADMIN — LEVALBUTEROL HYDROCHLORIDE 1.25 MG: 1.25 SOLUTION, CONCENTRATE RESPIRATORY (INHALATION) at 19:01

## 2019-01-05 RX ADMIN — CEFEPIME HYDROCHLORIDE 1000 MG: 1 INJECTION, POWDER, FOR SOLUTION INTRAMUSCULAR; INTRAVENOUS at 08:39

## 2019-01-05 NOTE — SPEECH THERAPY NOTE
Speech-Language Pathology Bedside Swallow Evaluation      Patient Name: Blaise Damico    Today's Date: 1/5/2019     Problem List  Patient Active Problem List   Diagnosis    Acute [facility] failure with [facility] )    Severe sepsis (HCC)    Aspiration of gastric contents    NSTEMI (non-ST elevated myocardial infarction) [facility] )    Hyperglycemia    Onychomycosis of toenail    Acute metabolic encephalopathy    Leukocytosis    Hypernatremia       Past Medical History  Past Medical History:   Diagnosis Date    Cancer [facility])     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  Past Surgical History  Past Surgical History:   Procedure Laterality Date    OTHER SURGICAL HISTORY      Pt had surgery on head for skin cancer  Summary   Pt presented with weak cough and s/s aspiration c tsps of thick liquid c high risk for aspiration with numerous pureed items (eg pureed fruits and any puree mixed with saliva)  While cough was immediate/timely after thick liquids, it was weak/ineffective (Yankauer utilized and mild/mod amts cleared)    Risk for Aspiration: high at this time  Recommendations: NPO and VBS/MBS     Recommended Form of Meds: non-oral if possible         Reason for Admission / Chief Complaint:  Hypoxic, unresponsive and agonal breathing     History of Present Illness:  Sheila Cheema is a 80 y o  female who presents to the ER 1/2 after being found at home unresponsive with agonal breathing  Per family and patient she started with episodes of vomiting yesterday evening  During 1 such episode she was in the bathroom vomiting and when family went to check on her she was unresponsive leaning against the toilet and agonal breathing  When EMS arrived her SpO2 was 50% on room air, she was placed on CPAP with improvement to 90%  In the ER she was placed on BiPAP    CT scan chest abdomen pelvis head was done:  No acute intracranial abnormalities seen; no PE, patchy bilateral consolidations and markedly fluid distended stomach  Blood cultures were obtained and she was given Zosyn and vancomycin  Pertinent labs: Lactic acid of 4 6, troponin 0 09, white count 13 11, creatinine 1 2, blood sugar of 323, and D-dimer of 3559  Patient was given a L of fluid in the ER and lactic acid improved to 3 1  She was also given Zofran and Tylenol for fever of 100 4  DX: Dehydration [E86 0]  Aspiration pneumonia ([address] ) [J69 0]  Syncope [R55]  Vomiting [R11 10]  Respiratory distress [R06 03]  Hypoxia [R09 02]  Elevated troponin [R74 8]  Elevated lactic acid level [R79 89  Onychomycosis [B35 1]    This morning, pt was transitioned to high-flow nasal cannula  Swallowing Evaluation ordered & completed bedside (13:21-14:14)        Past medical history:  Has not seen a PCP and 15 years  She has a medical history of skin cancer which was removed surgically 15 years ago  Special Studies:  CXR :Faint, nodular airspace opacities in the right lower lung zone suspicious for pneumonia  CTA of chest of 1/3: Patchy bilateral consolidative airspace disease throughout the lungs, consistent with infectious process  Findings may be secondary to aspiration, given markedly fluid distended stomach  CXR of : Multifocal airspace opacities, increased since the prior chest radiograph of 2019  Social/Education/Vocational Hx:  Pt resides with her son/dtr Apolonia Walker)  Daughter, Brittany Randolph, listed as emergency contact  Swallow Information   Current Risks for Dysphagia & Aspiration: current medical/respiratory issues     Current Diet: NPO      Baseline Diet: regular diet, thin liquids and presumed      Baseline Assessment   Behavior/Cognition: Sufficiently alert  Able to follow v simple 1 step commands  Family does report that they have noticed over last few years her mental status has been declining, she asked repeating questions and she asks where her  is who  25 years ago      Speech/Language Status: able to follow simple commands & able to participate in  basic conversation  Able to provide her name & children's name  Delayed recall of town born in and now lives in  Patient Positioning: upright in bed    Pain Status/Interventions/Response to Interventions:  No report of or nonverbal indications of pain  Swallow Mechanism Exam     Facial: symmetrical  Labial: WFL  Lingual: WFL  Velum: symmetrical, one reddened area on R side of posterior palate (just anterior to soft palate)  Oropharynx: thick secretions visualized in oropharynx (suctioning cleared some, additional ice chips given to moisten and assist clearance)  Mandible: adequate ROM  Dentition: edentulous and upper dentures  Vocal quality:moderately weak   Volitional Cough: significantly weak   Respiratory status:  On HFNC on 45L at 50% O2  Baseline RR: 22-28 and O2 sat 95%    Consistencies Assessed and Performance   Consistencies Administered: ice chips, nectar thick, honey thick and puree  Specific materials administered: ice chips to begin to aid release of thick secretions from oropharynx, then tsps of puree, honey & 1 of nectar thick liquid    Oral Stage: mild  Bolus formation and transfer were at least mildly prolonged with "tongue pump" transfer noted at times  No significant oral residue noted  No overt s/s reduced oral control  Pharyngeal Stage: multiple swallows and penetration/aspiration/risk with all thick liquids strongly suspected    Swallow Mechanics:  Swallowing initiation appeared mildly delayed  Laryngeal rise was palpated and judged to be mildly weak  Esophageal Concerns: none reported    Strategies and Efficacy: pt assessed in full upright positioning,  Slight neck flexion and small bolus size did not eliminate all sxs with the conservative materials administered today  Summary and Recommendations (see above)    After evaluation, I met with radha Prajapati and Ortega Mensah (14:15-14:30)    I educated them in swallowing A & P, impressions from BSE, positioning and strategies that myay maximize safet & recommendation for VBS (?tomorrow am)    Results Reviewed with: patient, RN, CRNP and family     Treatment & Goals Recommended: as indicated by VBS    Pt/Family Education: initiated and to continue as needed/indicated

## 2019-01-05 NOTE — PROGRESS NOTES
Progress Note - Critical Care   Sheila Glover 89 y o  female MRN: [id number]  Unit/Bed#:  Encounter: [id number]    Attending Physician: Kira Gates MD      ______________________________________________________________________  Assessment and Plan:   Principal Problem:    Severe sepsis [facility])  Active Problems:    Acute respiratory failure with hypoxia ([facility] [address] )    Aspiration of gastric contents    NSTEMI (non-ST elevated myocardial infarction) ([facility] [address] )    Acute metabolic encephalopathy    Leukocytosis    Hyperglycemia    Onychomycosis of toenail  Resolved Problems:    Lactic acidosis      Plan:      Neuro:   Acute encephalopathy likely secondary to sepsis with underlying component of dementia  · Serial Neuro Exams  · Delirium monitoring/management  · Regulate Sleep-wake cycle/CAM-ICU daily  · PRN melatonin    CV:   NSTEMI type 2 likely secondary to sepsis  · Troponin peaked at 1 34  · Hemodynamically stable  · MAP goal > 65  · Rhythm: NSR  · Follow rhythm on telemetry    Lung:   Acute hypoxic respiratory failure secondary to community acquired pneumonia/aspiration pneumomia  · Wean O2 for SP02 > 88%  · Currently on 4 L NC  · Continue xopenex prn Q 4 hours  · Continue cefepime and flagyl  · Discontinue vancomycin; MRSA culture negative    GI:   · Stress ulcer prophylaxis: not indicated  · Bowel regimen: senokot s    FEN:   · Fluid/Diuretic plan: lasix 20 mg x1 yesterday, consider lasix 20 mg x1 today  · Net fluid balance for past 24 hours: negative 978 ml  · Net fluid balance since admission: positive 1566 ml  · Nutrition/diet plan: regular diet ordered  · Replete electrolytes with goals: K >4 0, Mag >2 0, and Phos >3 0    :   · Indwelling Albert present: yes, discontinue if not planning to duirese  · Trend UOP and BUN/creat  · Strict I and O    ID:   Sepsis secondary to Community Acquired Pneumonia  · Likely with aspiration  · Abx ordered: continue flagyl and cefepime  · Trend temps and WBC count  · Blood cultures pending  · PCT improving; 20 91 from 29    Heme:   · Trend hgb and plts  · Transfuse as needed for goal hgb >7    Endo:   · Glycemic control plan: sliding scale insulin coverage  · Goal -180mg/dL    MSK/Skin:  · Early Mobility/Exercise  · PT consult: yes  · OT consult: yes  · Turn and position patient Q2 hours  · Off load pressure points  · Allevyn preventative per protocol    Onychomycosis  · Podiatry on consult    VTE Prophylaxis:  · Pharmacologic Prophylaxis: Heparin  · Mechanical Prophylaxis: sequential compression device    Disposition: Transfer to SD level 2       Code Status: Level 1 - Full Code    ______________________________________________________________________    24 Hour Events:  Diuresed with lasix 20 mg yesterday  Increased oxygen requirements over past 24 hours; now on 4L nasal cannula  Patient confused overnight  No events on telemetry  Review of Systems   Reason unable to perform ROS: mental status change      ______________________________________________________________________    Physical Exam:   Physical Exam   Constitutional: She is sleeping  She is easily aroused  She appears ill  HENT:   Head: Normocephalic and atraumatic  Mouth/Throat: Oropharynx is clear and moist    Eyes: Pupils are equal, round, and reactive to light  Conjunctivae are normal  Right eye exhibits no discharge  Left eye exhibits no discharge  No scleral icterus  Neck: Normal range of motion  No JVD present  Cardiovascular: Normal rate, regular rhythm and intact distal pulses  Exam reveals friction rub  Exam reveals no gallop  No murmur heard  Pulmonary/Chest: No stridor  She is in respiratory distress  She has rales  She exhibits no tenderness  Abdominal: Soft  Bowel sounds are normal  She exhibits no distension and no mass  There is no tenderness  There is no rebound  Genitourinary:   Genitourinary Comments: Albert to gravity drainage   Musculoskeletal: Normal range of motion   She exhibits no edema, tenderness or deformity  Lymphadenopathy:     She has no cervical adenopathy  Neurological: She is easily aroused  She is disoriented  GCS eye subscore is 4  GCS verbal subscore is 4  GCS motor subscore is 6  Skin: Skin is warm and dry  No rash noted  She is not diaphoretic  No erythema  No pallor  ______________________________________________________________________  Vitals:    19 0000 19 0259 19 0540 19 0700   BP:  128/61  121/88   BP Location:  Left arm  Left arm   Pulse:  67  96   Resp: 18 17  (!) 26   Temp:  99 7 °F (37 6 °C)  99 °F (37 2 °C)   TempSrc:  Probe  Probe   SpO2:  96%  (!) 87%   Weight:   47 kg (103 lb 9 9 oz)    Height:           Temperature:   Temp (24hrs), Av 9 °F (37 7 °C), Min:99 °F (37 2 °C), Max:100 6 °F (38 1 °C)    Current Temperature: 99 °F (37 2 °C)  Weights:   IBW: 47 8 kg    Body mass index is 19 58 kg/m²    Weight (last 2 days)     Date/Time   Weight    19 0540  47 (103 62)    19 0600  47 9 (105 6)    19 0747  44 5 (98 11)    19 0033  47 7 (105 16)            Hemodynamic Monitoring:  N/A     Non-Invasive/Invasive Ventilation Settings:  Respiratory    Lab Data (Last 4 hours)    None         O2/Vent Data (Last 4 hours)    None              No results found for: PHART, BER5DSO, PO2ART, XIM6YFY, T0JMGBYS, BEART, SOURCE  SpO2: SpO2: (!) 87 %  , SpO2 Activity: SpO2 Activity: At Rest, SpO2 Device: O2 Device: Nasal cannula  Intake and Outputs:  I/O       701 -  0700  07 -  07 07 -  0700    I V  (mL/kg) 1658 8 (34 6) 297 5 (6 3)     IV Piggyback 450 300     Total Intake(mL/kg) 2108 8 (44) 597 5 (12 7)     Urine (mL/kg/hr) 1090 (0 9) 1575 (1 4)     Total Output 1090 1575      Net +1018 8 -977 5                 UOP: 65 ml/hr   Nutrition:        Diet Orders            Start     Ordered    19 1441  Diet Cardiovascular; Cardiac Cath  Diet effective now     Question Answer Comment   Diet Type Cardiovascular    Cardiac Cardiac Cath    RD to adjust diet per protocol? No        01/03/19 1440    01/03/19 0918  Room Service  Once     Question:  Type of Service  Answer:  Room Service - Appropriate with Assistance    01/03/19 0917          Labs:     Results from last 7 days  Lab Units 01/05/19  0306 01/04/19  0326 01/03/19  0003 01/02/19  2347   WBC Thousand/uL 10 46* 13 16*  --  13 11*   HEMOGLOBIN g/dL 10 2* 11 1*  --  13 3   I STAT HEMOGLOBIN g/dl  --   --  13 3  --    HEMATOCRIT % 32 7* 36 2  --  43 2   HEMATOCRIT, ISTAT %  --   --  39  --    PLATELETS Thousands/uL 152 153  --  233   NEUTROS PCT %  --   --   --  75   BANDS PCT % 4 16*  --   --    MONOS PCT %  --   --   --  2*   MONO PCT % 8 4  --   --        Results from last 7 days  Lab Units 01/05/19  0306 01/04/19  1453 01/04/19 0327 01/03/19  0013  01/03/19  0003   SODIUM mmol/L 146* 144 144 143  --   --    POTASSIUM mmol/L 3 4* 3 7 4 2 4 3  < >  --    CHLORIDE mmol/L 110* 109* 112* 106  --   --    CO2 mmol/L 29 27 25 25  --   --    CO2, I-STAT mmol/L  --   --   --   --   --  24   ANION GAP mmol/L 7 8 7 12  --   --    BUN mg/dL 38* 38* 43* 41*  --   --    CREATININE mg/dL 0 83 1 10 0 92 1 20  --   --    CALCIUM mg/dL 8 1* 8 6 8 2* 8 7  --   --    ALT U/L  --   --   --  30  --   --    AST U/L  --   --   --  24  --   --    ALK PHOS U/L  --   --   --  76  --   --    ALBUMIN g/dL  --   --   --  3 6  --   --    TOTAL BILIRUBIN mg/dL  --   --   --  0 50  --   --    < > = values in this interval not displayed      Results from last 7 days  Lab Units 01/04/19 1453 01/04/19 0327   MAGNESIUM mg/dL 2 1 2 0   PHOSPHORUS mg/dL 2 3 2 4        Results from last 7 days  Lab Units 01/02/19  2347   INR  1 09   PTT seconds 22*       Results from last 7 days  Lab Units 01/04/19  0527 01/04/19  0200 01/03/19  2240 01/03/19  1947 01/03/19  1642 01/03/19  1421 01/03/19  1132   TROPONIN I ng/mL 1 25* 1 29* 1 34* 1 28* 1 02* 0 78* 0 50*       Results from last 7 days  Lab Units 01/03/19  0424 01/03/19  0158 01/02/19  2347   LACTIC ACID mmol/L 2 0 3 1* 4 6*     ABG:No results found for: PHART, WQH5ZYN, PO2ART, XOW7YQT, W1VQPTVK, BEART, SOURCE  VBG:      Results from last 7 days  Lab Units 01/05/19  0306 01/04/19  0326 01/03/19  0502   PROCALCITONIN ng/ml 20 91* 29 31* 14 79*     No results found for: [facility]   Imaging: none in past 24 hours  EKG: NSR on telemetry  Micro:    Results from last 7 days  Lab Units 01/03/19  1133 01/03/19  0117 01/02/19  2347   BLOOD CULTURE   --  No Growth at 24 hrs  No Growth at 24 hrs     MRSA CULTURE ONLY  No Methicillin Resistant Staphlyococcus aureus (MRSA) isolated  --   --    INFLUENZA B PCR  None Detected  --   --    RSV PCR  None Detected  --   --      Allergies: No Known Allergies  Medications:   Scheduled Meds:    Current Facility-Administered Medications:  cefepime 1,000 mg Intravenous Q24H DISHA Almanzar Last Rate: Stopped (01/04/19 1201)   heparin (porcine) 5,000 Units Subcutaneous Q12H [address] DISHA [place]    insulin lispro 1-5 Units Subcutaneous TID With Meals Jenn Murry PA-C    levalbuterol 1 25 mg Nebulization Q4H PRN DISHA Reinoso    melatonin 3 mg Oral HS PRN DISHA [place]    metroNIDAZOLE 500 mg Intravenous Q8H DISHA Almanzar Last Rate: 500 mg (01/05/19 0547)   ondansetron 4 mg Intravenous Q4H PRN DISHA Almanzar    pneumococcal 13-valent conjugate vaccine 0 5 mL Intramuscular Prior to discharge Jan Thayer MD    potassium chloride 20 mEq Oral Once DISHA [place]    senna-docusate sodium 1 tablet Oral BID DISHA [place]      Continuous Infusions:   PRN Meds:    levalbuterol 1 25 mg Q4H PRN   melatonin 3 mg HS PRN   ondansetron 4 mg Q4H PRN   pneumococcal 13-valent conjugate vaccine 0 5 mL Prior to discharge     VTE Pharmacologic Prophylaxis: Heparin  VTE Mechanical Prophylaxis: sequential compression device  Invasive lines and devices: Invasive Devices     Peripheral Intravenous Line            Peripheral IV 01/03/19 Right;Upper Arm 1 day    Peripheral IV 01/04/19 Left Wrist 1 day          Drain            Urethral Catheter Temperature probe 16 Fr  2 days                     Portions of the record may have been created with voice recognition software  Occasional wrong word or "sound a like" substitutions may have occurred due to the inherent limitations of voice recognition software  Read the chart carefully and recognize, using context, where substitutions have occurred      DISHA Davis

## 2019-01-06 ENCOUNTER — APPOINTMENT (INPATIENT)
Dept: RADIOLOGY | Facility: HOSPITAL | Age: 84
DRG: 871 | End: 2019-01-06
Payer: MEDICARE

## 2019-01-06 LAB
ANION GAP SERPL CALCULATED.3IONS-SCNC: 6 MMOL/L (ref 4–13)
BASOPHILS # BLD MANUAL: 0.1 THOUSAND/UL (ref 0–0.1)
BASOPHILS NFR MAR MANUAL: 1 % (ref 0–1)
BUN SERPL-MCNC: 33 MG/DL (ref 5–25)
CALCIUM SERPL-MCNC: 8.4 MG/DL (ref 8.3–10.1)
CHLORIDE SERPL-SCNC: 113 MMOL/L (ref 100–108)
CO2 SERPL-SCNC: 28 MMOL/L (ref 21–32)
CREAT SERPL-MCNC: 0.72 MG/DL (ref 0.6–1.3)
EOSINOPHIL # BLD MANUAL: 0 THOUSAND/UL (ref 0–0.4)
EOSINOPHIL NFR BLD MANUAL: 0 % (ref 0–6)
ERYTHROCYTE [DISTWIDTH] IN BLOOD BY AUTOMATED COUNT: 13.2 % (ref 11.6–15.1)
GFR SERPL CREATININE-BSD FRML MDRD: 74 ML/MIN/1.73SQ M
GLUCOSE SERPL-MCNC: 105 MG/DL (ref 65–140)
GLUCOSE SERPL-MCNC: 87 MG/DL (ref 65–140)
GLUCOSE SERPL-MCNC: 92 MG/DL (ref 65–140)
GLUCOSE SERPL-MCNC: 95 MG/DL (ref 65–140)
HCT VFR BLD AUTO: 32 % (ref 34.8–46.1)
HGB BLD-MCNC: 10.2 G/DL (ref 11.5–15.4)
LYMPHOCYTES # BLD AUTO: 1 THOUSAND/UL (ref 0.6–4.47)
LYMPHOCYTES # BLD AUTO: 10 % (ref 14–44)
MCH RBC QN AUTO: 30.9 PG (ref 26.8–34.3)
MCHC RBC AUTO-ENTMCNC: 31.9 G/DL (ref 31.4–37.4)
MCV RBC AUTO: 97 FL (ref 82–98)
MONOCYTES # BLD AUTO: 0.6 THOUSAND/UL (ref 0–1.22)
MONOCYTES NFR BLD: 6 % (ref 4–12)
NEUTROPHILS # BLD MANUAL: 8.26 THOUSAND/UL (ref 1.85–7.62)
NEUTS BAND NFR BLD MANUAL: 2 % (ref 0–8)
NEUTS SEG NFR BLD AUTO: 81 % (ref 43–75)
NRBC BLD AUTO-RTO: 0 /100 WBCS
OVALOCYTES BLD QL SMEAR: PRESENT
PLATELET # BLD AUTO: 159 THOUSANDS/UL (ref 149–390)
PLATELET BLD QL SMEAR: ADEQUATE
PMV BLD AUTO: 9.5 FL (ref 8.9–12.7)
POIKILOCYTOSIS BLD QL SMEAR: PRESENT
POTASSIUM SERPL-SCNC: 3.4 MMOL/L (ref 3.5–5.3)
RBC # BLD AUTO: 3.3 MILLION/UL (ref 3.81–5.12)
SODIUM SERPL-SCNC: 147 MMOL/L (ref 136–145)
TOTAL CELLS COUNTED SPEC: 100
WBC # BLD AUTO: 9.95 THOUSAND/UL (ref 4.31–10.16)

## 2019-01-06 PROCEDURE — 99233 SBSQ HOSP IP/OBS HIGH 50: CPT | Performed by: INTERNAL MEDICINE

## 2019-01-06 PROCEDURE — 80048 BASIC METABOLIC PNL TOTAL CA: CPT | Performed by: PHYSICIAN ASSISTANT

## 2019-01-06 PROCEDURE — 92611 MOTION FLUOROSCOPY/SWALLOW: CPT

## 2019-01-06 PROCEDURE — 82948 REAGENT STRIP/BLOOD GLUCOSE: CPT

## 2019-01-06 PROCEDURE — 94760 N-INVAS EAR/PLS OXIMETRY 1: CPT

## 2019-01-06 PROCEDURE — 94660 CPAP INITIATION&MGMT: CPT

## 2019-01-06 PROCEDURE — 94640 AIRWAY INHALATION TREATMENT: CPT

## 2019-01-06 PROCEDURE — 94664 DEMO&/EVAL PT USE INHALER: CPT

## 2019-01-06 PROCEDURE — 85027 COMPLETE CBC AUTOMATED: CPT | Performed by: PHYSICIAN ASSISTANT

## 2019-01-06 PROCEDURE — 85007 BL SMEAR W/DIFF WBC COUNT: CPT | Performed by: PHYSICIAN ASSISTANT

## 2019-01-06 PROCEDURE — 74230 X-RAY XM SWLNG FUNCJ C+: CPT

## 2019-01-06 RX ORDER — POTASSIUM CHLORIDE 20MEQ/15ML
40 LIQUID (ML) ORAL ONCE
Status: COMPLETED | OUTPATIENT
Start: 2019-01-06 | End: 2019-01-06

## 2019-01-06 RX ORDER — GUAIFENESIN 600 MG
600 TABLET, EXTENDED RELEASE 12 HR ORAL EVERY 12 HOURS SCHEDULED
Status: DISCONTINUED | OUTPATIENT
Start: 2019-01-06 | End: 2019-01-07

## 2019-01-06 RX ORDER — POTASSIUM CHLORIDE 14.9 MG/ML
20 INJECTION INTRAVENOUS
Status: DISCONTINUED | OUTPATIENT
Start: 2019-01-06 | End: 2019-01-06

## 2019-01-06 RX ORDER — POTASSIUM CHLORIDE 29.8 MG/ML
40 INJECTION INTRAVENOUS ONCE
Status: DISCONTINUED | OUTPATIENT
Start: 2019-01-06 | End: 2019-01-06

## 2019-01-06 RX ORDER — POTASSIUM CHLORIDE 20 MEQ/1
40 TABLET, EXTENDED RELEASE ORAL ONCE
Status: DISCONTINUED | OUTPATIENT
Start: 2019-01-06 | End: 2019-01-06

## 2019-01-06 RX ADMIN — SENNOSIDES AND DOCUSATE SODIUM 1 TABLET: 8.6; 5 TABLET ORAL at 17:23

## 2019-01-06 RX ADMIN — LEVALBUTEROL HYDROCHLORIDE 1.25 MG: 1.25 SOLUTION, CONCENTRATE RESPIRATORY (INHALATION) at 07:23

## 2019-01-06 RX ADMIN — GUAIFENESIN 600 MG: 600 TABLET, EXTENDED RELEASE ORAL at 20:21

## 2019-01-06 RX ADMIN — METRONIDAZOLE 500 MG: 500 INJECTION, SOLUTION INTRAVENOUS at 05:40

## 2019-01-06 RX ADMIN — HEPARIN SODIUM 5000 UNITS: 5000 INJECTION, SOLUTION INTRAVENOUS; SUBCUTANEOUS at 09:18

## 2019-01-06 RX ADMIN — POTASSIUM CHLORIDE 40 MEQ: 20 SOLUTION ORAL at 09:18

## 2019-01-06 RX ADMIN — LEVALBUTEROL HYDROCHLORIDE 1.25 MG: 1.25 SOLUTION, CONCENTRATE RESPIRATORY (INHALATION) at 13:03

## 2019-01-06 RX ADMIN — CEFEPIME HYDROCHLORIDE 1000 MG: 1 INJECTION, POWDER, FOR SOLUTION INTRAMUSCULAR; INTRAVENOUS at 09:18

## 2019-01-06 RX ADMIN — GUAIFENESIN 600 MG: 600 TABLET, EXTENDED RELEASE ORAL at 11:35

## 2019-01-06 RX ADMIN — ISODIUM CHLORIDE 3 ML: 0.03 SOLUTION RESPIRATORY (INHALATION) at 19:29

## 2019-01-06 RX ADMIN — ISODIUM CHLORIDE 3 ML: 0.03 SOLUTION RESPIRATORY (INHALATION) at 13:03

## 2019-01-06 RX ADMIN — METRONIDAZOLE 500 MG: 500 INJECTION, SOLUTION INTRAVENOUS at 21:51

## 2019-01-06 RX ADMIN — HEPARIN SODIUM 5000 UNITS: 5000 INJECTION, SOLUTION INTRAVENOUS; SUBCUTANEOUS at 20:21

## 2019-01-06 RX ADMIN — ISODIUM CHLORIDE 3 ML: 0.03 SOLUTION RESPIRATORY (INHALATION) at 07:23

## 2019-01-06 RX ADMIN — LEVALBUTEROL HYDROCHLORIDE 1.25 MG: 1.25 SOLUTION, CONCENTRATE RESPIRATORY (INHALATION) at 19:29

## 2019-01-06 RX ADMIN — METRONIDAZOLE 500 MG: 500 INJECTION, SOLUTION INTRAVENOUS at 13:45

## 2019-01-06 NOTE — PROGRESS NOTES
Progress Note - ICU Transfer to SD/MS tele   Sheila Glover 89 y o  female MRN: [id number]  [id number]    Unit/Bed#:  Encounter: [id number]    Code Status: Level 3 - DNAR and DNI  POA:    POLST:      Reason for ICU admission: hypoxia found down    Active problems:   Principal Problem:    Severe sepsis ([address] )  Active Problems:    Acute respiratory failure with hypoxia ([address] )    Aspiration of gastric contents    NSTEMI (non-ST elevated myocardial infarction) ([facility] [address] )    Acute metabolic encephalopathy    Leukocytosis    Hyperglycemia    Onychomycosis of toenail    Hypernatremia  Resolved Problems:    Lactic acidosis      Consultants:   PT OT  Speech language pathology  Pharmacy    History of Present Illness:  As per  Danielito Perez " Sheila Raymond is a 80 y o  female who presents to the ER after being found at home unresponsive with agonal breathing  Per family and patient she started with episodes of vomiting yesterday evening  During 1 such episode she was in the bathroom vomiting and when family went to check on her she was unresponsive leaning against the toilet and agonal breathing  When EMS arrived her SpO2 was 50% on room air, she was placed on CPAP with improvement to 90%  In the ER she was placed on BiPAP  CT scan chest abdomen pelvis head was done:  No acute intracranial abnormalities seen; no PE, patchy bilateral consolidations and markedly fluid distended stomach  Blood cultures were obtained and she was given Zosyn and vancomycin  Pertinent labs: Lactic acid of 4 6, troponin 0 09, white count 13 11, creatinine 1 2, blood sugar of 323, and D-dimer of 3559  Patient was given a L of fluid in the ER and lactic acid improved to 3 1  She was also given Zofran and Tylenol for fever of 100 4  Patient does not regularly see a primary care, has not seen a PCP and 15 years  She has a medical history of skin cancer which was removed surgically 15 years ago    She only takes vitamins at home  Family does report that they have noticed over last few years her mental status has been declining, she asked repeating questions and she asks where her  is who  25 years ago"    Summary of clinical course:   Admitted to the intensive care unit as a step-down level 1 status  Required BiPAP for hypoxic respiratory failure  Eventually titrated off to nasal cannula  Antibiotics were continued including cefepime and Flagyl for coverage of aspiration respiratory illness causes  Plans for 7 day duration of these antibiotics for aspiration pneumonitis versus more probable right aspiration pneumonia  Severe sepsis and lactic acidosis improved, pulmonary source  Clinical status continued to improve, hemodynamics maintained stable  Episode of hypoxia on 2019 morning, prompting high-flow nasal cannula start  This was titrated to Nasal cannula on 2019  Acute kidney injury improved  She failed her speech language evaluation 2019, recommendations for video barium swallow were carried out, with testing 2019  She was cleared for dysphagia level 2 diet and thin liquid by speech language pathology  She was felt to be suitable for med surgical floor part of the Internal Medicine service  Pulmonology will continue to follow for further recommendations  Keeping consistent with patient's previous wishes and further discussion with family her code status was made level 3 DNR DNI on 2019 with daughter and son present at bedside who were in agreement      Recent or scheduled procedures:   None    Outstanding/pending diagnostics:   None    Cultures:   Blood cultures negative to uriel  MRSA nares negative  Flu A/B plus RSV negative       Mobilization Plan:   Out of bed to chair with assistance, PT following    Nutrition Plan:   Dysphagia level 2, thin liquid, emphasized protein    VTE Pharmacologic Prophylaxis: Heparin  VTE Mechanical Prophylaxis: sequential compression device    Discharge Plan:   Patient should be ready for discharge to to home versus rehab pending clinical course    Initial Physical Therapy Recommendations:  Please see initial evaluation on 01/04/19, requires assist of at least 1 moderate assist with chair to follow  Initial /Plan:  Possible recommendations to short-term rehab list was provided with family case management continues to follow         Spoke with Dr Renetta Singer by phone to review hospitalization, data and regarding transfer of Ms Ro Jean  Please call ICU with any questions or concerns  Portions of the record may have been created with voice recognition software  Occasional wrong word or "sound a like" substitutions may have occurred due to the inherent limitations of voice recognition software  Read the chart carefully and recognize, using context, where substitutions have occurred      Yaritza Gama PA-C

## 2019-01-06 NOTE — PLAN OF CARE
Problem: SLP ADULT - SWALLOWING, IMPAIRED  Goal: Initial SLP swallow eval performed  Outcome: Progressing  Swallow Evaluation process underway    PT npo pending VBS (planned for Sunday)

## 2019-01-06 NOTE — PROCEDURES
Video Barium Swallow Study      Patient Name: Sheila Kemp Date: 2019        Past Medical History  Past Medical History:   Diagnosis Date    Cancer [facility])     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  Past Surgical History  Past Surgical History:   Procedure Laterality Date    OTHER SURGICAL HISTORY      Pt had surgery on head for skin cancer  General Information:  Date of Evaluation: 19  Type of Study: Initial MBS study  Diet Prior to this Study: Regular diet/ Thin/ All liquids  Past Medical History: Refer to initial BSE  Additional History: Today, patient off high flow nasal cannula (on O2 via NC) with less coughing at baseline  Oral care and clearance of small amount of thick secretions from oropharynx completed just prior to study  Positionin degree in lateral plane  Materials Administered: Puree, Soft solids, Honey thick liquid, Nectar thick liquid, and Thin liquid via straw    Oral Stage:  Mild to moderately impaired for hard solids  Patient with reduced/ prolonged mastication of soft solids with mild residue post swallows which cleared with a thin liquid wash down  Pharyngeal Stage:  Within Functional Limits  Aspiration Risk: There was only one occasion of transient laryngeal penetration of thin liquid via straw during rapid, successive swallows  However, there was no deep laryngeal penetration or aspiration of puree, honey thick, nectar thick, or thin liquid  Swallow Initiation was: Prompt, Mildly delayed  Hyolaryngeal Excursion was: Adequate  Epiglottic Inversion was[de-identified] Present  Tongue Drive was: Adequate  Pharyngeal Constriction was: Adequate  Cricopharyngeal Opening/ Relaxation was: Adequate  Cricopharyngeal Prominence/ Bar: Noted; however, mild which had no impact on overall swallow function    Esophageal Stage:  Within functional limits  No back flow or stasis evident       Impressions:  Oral pharyngeal swallowing skills are safe/ within functional limits for at least soft solids and thin/ all liquids  There was no deep laryngeal penetration or aspiration of any textures  Patient with some prolonged mastication (only has top plate) and mild oral residue post swallows of soft foods which cleared with a liquid wash down  Diagnosis/ Prognosis:  Mild to moderate oral stage dysphagia for hard solids  Prognosis for Safe Diet Advancement: Good  Barriers to Reach Goals: Age  Results and recommendations reviewed with: Patient and RN    Recommendations:  Diet Texture: Dysphagia 2/ Mechanical Soft  Liquid Consistency:  Thin  Liquid Administration: Small sips via cup or straw  Medication Administration: Medications crushed or whole in a puree bolus  Suggested Positioning: Upright/ 90 degree angle  Suggested Precautions: Aspiration precautions, Feed when alert and fully upright position, Cue for small bites/sips, Cue for slow rate, Reflux precautions, Remain fully upright 45 minutes after meals  Strategies: Small bites/sips, Alternate food/ liquid intakes  Dysphagia Treatment Recommendations: Yes, swallow follow up for diet tolerance  Further Evaluation by: Nutritional services for supplements   Consider Follow-up VBS: Not indicated at this time    Thank you for this referral     Boogie Goins MA, [id number] [facility]  Speech Language Pathologist

## 2019-01-06 NOTE — PROGRESS NOTES
Progress Note - Critical Care   Sheila Glover 89 y o  female MRN: [id number]  Unit/Bed#:  Encounter: [id number]    Assessment:   Principal Problem:    Severe sepsis ([address] )  Active Problems:    Acute respiratory failure with hypoxia (HCC)    Aspiration of gastric contents    NSTEMI (non-ST elevated myocardial infarction) (HCC)    Acute metabolic encephalopathy    Leukocytosis    Hyperglycemia    Onychomycosis of toenail    Hypernatremia  Resolved Problems:    Lactic acidosis      Plan  Neuro:   Insomnia  Delerium / Encephalopathy  · Pain controlled with: prn meds  · Melatonin qhs  · Delirium Precautions  · CAM ICU per protocol  · Regulate sleep/wake cycle+  · Trend neuro exam  · Reorientation and delerium precautions discussed with family bedside this am  CV:   NSTEMI type 2 sepsis, improved  · MAP goal > 65  · Rhythm: NSR  · Follow rhythm on telemetry  Lung:   Hypoxic resp failure - acute improving, required Hiflo NC overnight  Aspiration pneumonitis vs pneumonia  · Cefepime flagyl day 4 of 7 planned  · sp02 goal >88%  · Continue to follow micro bio, mrsa neg, blood cx negative to day  · Trial off Hiflow this am, monitor resp status  · Pulmonary toileting and IS  · Wean fio2 as able  · Start mucinex po when able  · Serial nebs  GI:   Dysphagia with aspiration risks  · Failed swallow eval, for barium swallow this am  · Monitor if needs continued NPO  · Stress ulcer prophylaxis: No prophylaxis needed  · Bowel regimen: sennakot  and miralax  FEN:   Hypokalemia  Hypernatremia  · Goal 24 hour fluid balance: even   Fluid/Diuretic plan: encourage free water when able  · Nutrition/diet plan: npo present, for barium eval, may need D5W to correct free water deficit if unable to start diet  · Replete electrolytes with goals: K >4 0, Mag >2 0, and Phos >3 0  :   1 episode of urine retention overnigh  · Indwelling Albert present: no   · Trend UOP and BUN/creat  · Strict I and O  · Monitor urine retention protocol  ID: Improving leukocytosis  Aspiration pneumonitis vs pneumonia - Pna more likely with developing right opacity  · Abx ordered: flagyl and cefepime  · Day 4/7 planned  · Trend temps and WBC count  · procalcitonin trending down  Heme:   No issues  · Trend hgb and plts  · Transfuse as needed for goal hgb >7  Endo:   · Glycemic control plan: Blood glucose controlled on current regimen  · Insuline sliding scale  MSK/Skin:  · Mobility goal: OOB today, follow PT rec's  · PT consult: yes  · OT consult: yes  · Frequent turning and pressure off-loading  Family:  · Family updated within 24 hours: yes   VTE Prophylaxis:  · Pharmacologic Prophylaxis:Heparin  · Mechanical Prophylaxis: sequential compression device    Disposition: continue step down status at present      ______________________________________________________________________  Chief Complaint: no complaints      HPI/24hr events: denies thirst, no new cough, denies fevers chills rigors or sob  Required x1 straight cath overnight yielding 400cc, family present bedside  Failed slp eval yesterday -deemed high aspiration risk  For Video barium eval this am  NPO at present  Hiflo overnight at 40Lmin/40% no issues      Review of Systems   Constitutional: Negative for chills, fatigue and fever  HENT: Negative for hearing loss, postnasal drip, sinus pressure and sore throat  Eyes: Negative for photophobia and visual disturbance  Respiratory: Negative for cough, chest tightness and shortness of breath  Cardiovascular: Negative for chest pain, palpitations and leg swelling  Gastrointestinal: Negative for abdominal distention, abdominal pain, constipation, diarrhea, nausea and vomiting  Endocrine: Negative for polyphagia and polyuria  Genitourinary: Negative for dysuria, enuresis, flank pain and urgency  Musculoskeletal: Negative for back pain, gait problem and joint swelling  Skin: Negative for rash     Neurological: Negative for dizziness, tremors, seizures, syncope, light-headedness, numbness and headaches  Hematological: Does not bruise/bleed easily  Psychiatric/Behavioral: Negative for behavioral problems, confusion and hallucinations  The patient is not nervous/anxious  on Hiflo at 40/40 saturation 95-98%  ______________________________________________________________________  Temperature:   Temp (24hrs), Av 1 °F (37 3 °C), Min:98 1 °F (36 7 °C), Max:100 4 °F (38 °C)    Current Temperature: 98 8 °F (37 1 °C)    Vitals:    19 0322 19 0600 19 0700 19 0723   BP: 143/71  (!) 190/95    BP Location: Left arm  Left arm    Pulse: 91  68    Resp: 21  21    Temp: 99 7 °F (37 6 °C)  98 8 °F (37 1 °C)    TempSrc: Axillary  Oral    SpO2: 96%  95% 95%   Weight:  46 4 kg (102 lb 4 7 oz)     Height:                  Weights:   IBW: 47 8 kg    Body mass index is 19 33 kg/m²  Weight (last 2 days)     Date/Time   Weight    19 0600  46 4 (102 29)    19 0540  47 (103 62)    19 0600  47 9 (105 6)            Height: 5' 1" (154 9 cm)     No results found for: PHART, GUP7VSG, PO2ART, NUH1YTE, X4XULUXU, BEART, SOURCE  SpO2: SpO2: 95 %  ______________________________________________________________________  Physical Exam:     Physical Exam   Constitutional: No distress  Elderly, chronically ill, frail   HENT:   Head: Normocephalic and atraumatic  Nose: Nose normal    Mouth/Throat: No oropharyngeal exudate  Eyes: Pupils are equal, round, and reactive to light  Conjunctivae and EOM are normal  No scleral icterus  Neck: No JVD present  No tracheal deviation present  Cardiovascular: Normal rate, regular rhythm, normal heart sounds and intact distal pulses  No murmur heard  Pulmonary/Chest: Effort normal  No respiratory distress  Coarse bs anteriorly, rhonchi   Abdominal: Soft  Bowel sounds are normal  There is no tenderness  There is no rebound  Musculoskeletal: Normal range of motion   She exhibits no edema or deformity  Neurological: She is alert  No cranial nerve deficit  GCS eye subscore is 4  GCS verbal subscore is 5  GCS motor subscore is 6  Oriented to person place situation, not time   Skin: Skin is dry  No rash noted  She is not diaphoretic  No erythema  Psychiatric: She has a normal mood and affect  Her behavior is normal      ______________________________________________________________________  Intake and Outputs:  I/O       01/04 0701 - 01/05 0700 01/05 0701 - 01/06 0700 01/06 0701 - 01/07 0700    I V  (mL/kg) 297 5 (6 3)      IV Piggyback 300 250     Total Intake(mL/kg) 597 5 (12 7) 250 (5 4)     Urine (mL/kg/hr) 1575 (1 4) 650 (0 6)     Stool  1     Total Output 1575 651      Net -977 5 -401             Unmeasured Stool Occurrence  1 x           Nutrition:        Diet Orders            Start     Ordered    01/05/19 0848  Diet NPO  Diet effective now     Question Answer Comment   Diet Type NPO    RD to adjust diet per protocol? No        01/05/19 0849    01/03/19 0918  Room Service  Once     Question:  Type of Service  Answer:  Room Service - Appropriate with Assistance    01/03/19 0917          Labs:     Results from last 7 days  Lab Units 01/06/19  0456 01/05/19  0306 01/04/19  0326  01/02/19  2347   WBC Thousand/uL 9 95 10 46* 13 16*  --  13 11*   HEMOGLOBIN g/dL 10 2* 10 2* 11 1*  --  13 3   I STAT HEMOGLOBIN   --   --   --   < >  --    HEMATOCRIT % 32 0* 32 7* 36 2  --  43 2   HEMATOCRIT, ISTAT   --   --   --   < >  --    PLATELETS Thousands/uL 159 152 153  --  233   NEUTROS PCT %  --   --   --   --  75   MONOS PCT %  --   --   --   --  2*   MONO PCT % 6 8 4  --   --    < > = values in this interval not displayed     Results from last 7 days  Lab Units 01/06/19  0456 01/05/19  1634 01/05/19  0306  01/03/19  0013 01/03/19  0003   SODIUM mmol/L 147* 147* 146*  < > 143  --    POTASSIUM mmol/L 3 4* 3 6 3 4*  < > 4 3  --    CHLORIDE mmol/L 113* 112* 110*  < > 106  --    CO2 mmol/L 28 27 29  < > 25  -- CO2, I-STAT mmol/L  --   --   --   --   --  24   BUN mg/dL 33* 36* 38*  < > 41*  --    CREATININE mg/dL 0 72 0 81 0 83  < > 1 20  --    CALCIUM mg/dL 8 4 8 4 8 1*  < > 8 7  --    ALK PHOS U/L  --   --   --   --  76  --    ALT U/L  --   --   --   --  30  --    AST U/L  --   --   --   --  24  --    GLUCOSE, ISTAT mg/dl  --   --   --   --   --  341*   < > = values in this interval not displayed  Results from last 7 days  Lab Units 01/04/19  1453 01/04/19  0327   MAGNESIUM mg/dL 2 1 2 0       Results from last 7 days  Lab Units 01/04/19  1453 01/04/19  0327   PHOSPHORUS mg/dL 2 3 2 4        Results from last 7 days  Lab Units 01/02/19  2347   INR  1 09   PTT seconds 22*       Results from last 7 days  Lab Units 01/03/19  0424   LACTIC ACID mmol/L 2 0       0  Lab Value Date/Time   TROPONINI 1 25 (H) 01/04/2019 0527   TROPONINI 1 29 (H) 01/04/2019 0200   TROPONINI 1 34 (H) 01/03/2019 2240   TROPONINI 1 28 (H) 01/03/2019 1947   TROPONINI 1 02 (H) 01/03/2019 1642   TROPONINI 0 78 (H) 01/03/2019 1421   TROPONINI 0 50 (H) 01/03/2019 1132   TROPONINI 0 39 (H) 01/03/2019 0746   TROPONINI 0 37 (H) 01/03/2019 0504   TROPONINI 0 09 (H) 01/03/2019 0013     Imaging: CXR yesterday I have personally reviewed pertinent reports      EKG: NSR  Micro:  Lab Results   Component Value Date    BLOODCX No Growth at 48 hrs  01/03/2019    BLOODCX No Growth at 48 hrs  01/02/2019     Allergies: No Known Allergies  Medications:   Scheduled Meds:  Current Facility-Administered Medications:  bisacodyl 10 mg Rectal Daily PRN DISHA [place]    cefepime 1,000 mg Intravenous Q24H DISHA Krause Last Rate: 1,000 mg (01/05/19 0839)   dextromethorphan-guaiFENesin 10 mL Oral Q4H PRN Jak Yanes PA-C    heparin (porcine) 5,000 Units Subcutaneous Q12H [facility] DISHA [place]    insulin lispro 1-5 Units Subcutaneous Q6H [facility] DISHA [place]    levalbuterol 1 25 mg Nebulization TID Jak Yanes PA-C    melatonin 3 mg Oral HS PRN DISHA [place]    metroNIDAZOLE 500 mg Intravenous Q8H , CRNP Last Rate: 500 mg (01/06/19 0540)   ondansetron 4 mg Intravenous Q4H PRN DISHA    pneumococcal 13-valent conjugate vaccine 0 5 mL Intramuscular Prior to discharge Harvey Valencia MD    potassium chloride 20 mEq Oral Once DISHA [place]    potassium chloride 40 mEq Oral Once Phyllis Ribeiro PA-C    senna-docusate sodium 1 tablet Oral BID DISHA [place]    sodium chloride 3 mL Nebulization TID Harvey Valencia MD      Continuous Infusions:   PRN Meds:    bisacodyl 10 mg Daily PRN   dextromethorphan-guaiFENesin 10 mL Q4H PRN   melatonin 3 mg HS PRN   ondansetron 4 mg Q4H PRN   pneumococcal 13-valent conjugate vaccine 0 5 mL Prior to discharge       Invasive lines and devices: Invasive Devices     Peripheral Intravenous Line            Peripheral IV 01/03/19 Right;Upper Arm 2 days    Peripheral IV 01/04/19 Left Wrist 2 days                   Counseling / Coordination of Care  Total time spent today 50 minutes  Greater than 50% of total time was spent with the patient and / or family counseling and / or coordination of care  A description of the counseling / coordination of care: discussing care with family, examining patient, reviewing data/imaging/labs, planning care    Code Status: Level 1 - Full Code    Portions of the record may have been created with voice recognition software  Occasional wrong word or "sound a like" substitutions may have occurred due to the inherent limitations of voice recognition software  Read the chart carefully and recognize, using context, where substitutions have occurred      Phyllis Ribeiro PA-C

## 2019-01-07 LAB
ANION GAP SERPL CALCULATED.3IONS-SCNC: 9 MMOL/L (ref 4–13)
BASOPHILS # BLD AUTO: 0.04 THOUSANDS/ΜL (ref 0–0.1)
BASOPHILS NFR BLD AUTO: 0 % (ref 0–1)
BUN SERPL-MCNC: 33 MG/DL (ref 5–25)
CALCIUM SERPL-MCNC: 8.2 MG/DL (ref 8.3–10.1)
CHLORIDE SERPL-SCNC: 113 MMOL/L (ref 100–108)
CO2 SERPL-SCNC: 26 MMOL/L (ref 21–32)
CREAT SERPL-MCNC: 0.75 MG/DL (ref 0.6–1.3)
EOSINOPHIL # BLD AUTO: 0.05 THOUSAND/ΜL (ref 0–0.61)
EOSINOPHIL NFR BLD AUTO: 1 % (ref 0–6)
ERYTHROCYTE [DISTWIDTH] IN BLOOD BY AUTOMATED COUNT: 13.3 % (ref 11.6–15.1)
GFR SERPL CREATININE-BSD FRML MDRD: 70 ML/MIN/1.73SQ M
GLUCOSE SERPL-MCNC: 105 MG/DL (ref 65–140)
GLUCOSE SERPL-MCNC: 110 MG/DL (ref 65–140)
GLUCOSE SERPL-MCNC: 111 MG/DL (ref 65–140)
GLUCOSE SERPL-MCNC: 115 MG/DL (ref 65–140)
GLUCOSE SERPL-MCNC: 118 MG/DL (ref 65–140)
GLUCOSE SERPL-MCNC: 95 MG/DL (ref 65–140)
HCT VFR BLD AUTO: 32.4 % (ref 34.8–46.1)
HGB BLD-MCNC: 10.1 G/DL (ref 11.5–15.4)
IMM GRANULOCYTES # BLD AUTO: 0.03 THOUSAND/UL (ref 0–0.2)
IMM GRANULOCYTES NFR BLD AUTO: 0 % (ref 0–2)
LYMPHOCYTES # BLD AUTO: 1.02 THOUSANDS/ΜL (ref 0.6–4.47)
LYMPHOCYTES NFR BLD AUTO: 11 % (ref 14–44)
MAGNESIUM SERPL-MCNC: 2.1 MG/DL (ref 1.6–2.6)
MCH RBC QN AUTO: 30.2 PG (ref 26.8–34.3)
MCHC RBC AUTO-ENTMCNC: 31.2 G/DL (ref 31.4–37.4)
MCV RBC AUTO: 97 FL (ref 82–98)
MONOCYTES # BLD AUTO: 0.72 THOUSAND/ΜL (ref 0.17–1.22)
MONOCYTES NFR BLD AUTO: 8 % (ref 4–12)
NEUTROPHILS # BLD AUTO: 7.68 THOUSANDS/ΜL (ref 1.85–7.62)
NEUTS SEG NFR BLD AUTO: 80 % (ref 43–75)
NRBC BLD AUTO-RTO: 0 /100 WBCS
PHOSPHATE SERPL-MCNC: 2.2 MG/DL (ref 2.3–4.1)
PLATELET # BLD AUTO: 177 THOUSANDS/UL (ref 149–390)
PMV BLD AUTO: 10 FL (ref 8.9–12.7)
POTASSIUM SERPL-SCNC: 3.7 MMOL/L (ref 3.5–5.3)
RBC # BLD AUTO: 3.34 MILLION/UL (ref 3.81–5.12)
SODIUM SERPL-SCNC: 148 MMOL/L (ref 136–145)
WBC # BLD AUTO: 9.54 THOUSAND/UL (ref 4.31–10.16)

## 2019-01-07 PROCEDURE — 97116 GAIT TRAINING THERAPY: CPT

## 2019-01-07 PROCEDURE — 97530 THERAPEUTIC ACTIVITIES: CPT

## 2019-01-07 PROCEDURE — 92526 ORAL FUNCTION THERAPY: CPT

## 2019-01-07 PROCEDURE — 80048 BASIC METABOLIC PNL TOTAL CA: CPT | Performed by: PHYSICIAN ASSISTANT

## 2019-01-07 PROCEDURE — 94668 MNPJ CHEST WALL SBSQ: CPT

## 2019-01-07 PROCEDURE — 99232 SBSQ HOSP IP/OBS MODERATE 35: CPT | Performed by: INTERNAL MEDICINE

## 2019-01-07 PROCEDURE — 83735 ASSAY OF MAGNESIUM: CPT | Performed by: PHYSICIAN ASSISTANT

## 2019-01-07 PROCEDURE — 94640 AIRWAY INHALATION TREATMENT: CPT

## 2019-01-07 PROCEDURE — 82948 REAGENT STRIP/BLOOD GLUCOSE: CPT

## 2019-01-07 PROCEDURE — 94760 N-INVAS EAR/PLS OXIMETRY 1: CPT

## 2019-01-07 PROCEDURE — 85025 COMPLETE CBC W/AUTO DIFF WBC: CPT | Performed by: PHYSICIAN ASSISTANT

## 2019-01-07 PROCEDURE — 84100 ASSAY OF PHOSPHORUS: CPT | Performed by: PHYSICIAN ASSISTANT

## 2019-01-07 PROCEDURE — 94664 DEMO&/EVAL PT USE INHALER: CPT

## 2019-01-07 RX ORDER — GUAIFENESIN 600 MG
1200 TABLET, EXTENDED RELEASE 12 HR ORAL EVERY 12 HOURS SCHEDULED
Status: DISCONTINUED | OUTPATIENT
Start: 2019-01-07 | End: 2019-01-10 | Stop reason: HOSPADM

## 2019-01-07 RX ADMIN — HEPARIN SODIUM 5000 UNITS: 5000 INJECTION, SOLUTION INTRAVENOUS; SUBCUTANEOUS at 22:26

## 2019-01-07 RX ADMIN — CEFEPIME HYDROCHLORIDE 1000 MG: 1 INJECTION, POWDER, FOR SOLUTION INTRAMUSCULAR; INTRAVENOUS at 07:43

## 2019-01-07 RX ADMIN — METRONIDAZOLE 500 MG: 500 INJECTION, SOLUTION INTRAVENOUS at 14:59

## 2019-01-07 RX ADMIN — METRONIDAZOLE 500 MG: 500 INJECTION, SOLUTION INTRAVENOUS at 22:27

## 2019-01-07 RX ADMIN — ISODIUM CHLORIDE 3 ML: 0.03 SOLUTION RESPIRATORY (INHALATION) at 08:31

## 2019-01-07 RX ADMIN — SENNOSIDES AND DOCUSATE SODIUM 1 TABLET: 8.6; 5 TABLET ORAL at 17:21

## 2019-01-07 RX ADMIN — LEVALBUTEROL HYDROCHLORIDE 1.25 MG: 1.25 SOLUTION, CONCENTRATE RESPIRATORY (INHALATION) at 14:41

## 2019-01-07 RX ADMIN — GUAIFENESIN 1200 MG: 600 TABLET, EXTENDED RELEASE ORAL at 22:26

## 2019-01-07 RX ADMIN — CEFTRIAXONE SODIUM 1000 MG: 10 INJECTION, POWDER, FOR SOLUTION INTRAVENOUS at 13:54

## 2019-01-07 RX ADMIN — LEVALBUTEROL HYDROCHLORIDE 1.25 MG: 1.25 SOLUTION, CONCENTRATE RESPIRATORY (INHALATION) at 19:49

## 2019-01-07 RX ADMIN — ISODIUM CHLORIDE 3 ML: 0.03 SOLUTION RESPIRATORY (INHALATION) at 19:49

## 2019-01-07 RX ADMIN — GUAIFENESIN 600 MG: 600 TABLET, EXTENDED RELEASE ORAL at 08:25

## 2019-01-07 RX ADMIN — HEPARIN SODIUM 5000 UNITS: 5000 INJECTION, SOLUTION INTRAVENOUS; SUBCUTANEOUS at 08:25

## 2019-01-07 RX ADMIN — METRONIDAZOLE 500 MG: 500 INJECTION, SOLUTION INTRAVENOUS at 05:38

## 2019-01-07 RX ADMIN — ISODIUM CHLORIDE 3 ML: 0.03 SOLUTION RESPIRATORY (INHALATION) at 14:41

## 2019-01-07 RX ADMIN — LEVALBUTEROL HYDROCHLORIDE 1.25 MG: 1.25 SOLUTION, CONCENTRATE RESPIRATORY (INHALATION) at 08:31

## 2019-01-07 RX ADMIN — SENNOSIDES AND DOCUSATE SODIUM 1 TABLET: 8.6; 5 TABLET ORAL at 08:25

## 2019-01-07 NOTE — PROGRESS NOTES
Progress Note - Pulmonary   Sheila Glover 89 y o  female MRN: [id number]  Unit/Bed#: -01 Encounter: [id number]    Assessment/Plan:    Acute hypoxic respiratory failure due to aspiration pneumonia/pneumonitis with worsening consolidation right lower lobe (abnormal chest x-ray)   Titrate FiO2 to maintain saturations greater than or equal to 88%  Out of bed as tolerated  Add flutter valve and incentive spirometer  May need vest therapy if cannot use flutter valve  Continue cefepime and Flagyl to complete seven day course  Increase Mucinex to 1200 milligrams twice daily  Continue Xopenex t i d  For pulmonary hygiene  Will need repeat chest x-ray in 4 to 6 weeks  Oral dysphagia with aspiration as above   Aspiration precautions and modified diet with speech therapy following  Need for pneumococcal vaccine   PPV 13 this admission  Outpatient follow-up and repeat imaging pending hospital course  Discussed with nursing  Chief Complaint:    "I'm okay "    Subjective:    Sheila reports she feels okay  She reports she is here for pneumonia and feels as though she is getting better  She has a loose cough and is bringing up mucus  She denies hemoptysis  She denies chest pain  She denies shortness of breath sitting in the chair  Objective:    Vitals: Blood pressure (!) 172/81, pulse 89, temperature 99 °F (37 2 °C), temperature source Oral, resp  rate 18, height 5' 1" (1 549 m), weight 46 4 kg (102 lb 4 7 oz), SpO2 91 %   3 liters nasal cannula,Body mass index is 19 33 kg/m²  Intake/Output Summary (Last 24 hours) at 01/07/19 1118  Last data filed at 01/07/19 1015   Gross per 24 hour   Intake              300 ml   Output              450 ml   Net             -150 ml       Invasive Devices     Peripheral Intravenous Line            Peripheral IV 01/04/19 Left Wrist 3 days                Physical Exam:     Physical Exam   Constitutional: She appears well-developed and well-nourished   She is cooperative  Non-toxic appearance  No distress  HENT:   Head: Normocephalic and atraumatic  Mouth/Throat: No oropharyngeal exudate  Eyes: EOM are normal  No scleral icterus  Neck: Neck supple  No JVD present  No tracheal deviation present  Cardiovascular: Normal rate, regular rhythm, S1 normal and S2 normal   Exam reveals no gallop and no friction rub  No murmur heard  Pulmonary/Chest: Effort normal  No accessory muscle usage or stridor  No tachypnea  No respiratory distress  She has no decreased breath sounds  She has no wheezes  She has rhonchi  She has no rales  She exhibits no tenderness  Abdominal: Soft  Bowel sounds are normal  She exhibits no distension  There is no tenderness  There is no rebound and no guarding  Musculoskeletal: She exhibits no edema  Neurological: She is alert  She has normal strength  No sensory deficit  GCS eye subscore is 4  GCS verbal subscore is 5  GCS motor subscore is 6  Skin: Skin is warm and dry  No abrasion, no ecchymosis, no lesion and no rash noted  She is not diaphoretic  No cyanosis or erythema  Psychiatric: Her speech is normal    Vitals reviewed        Labs:   CBC:   Lab Results   Component Value Date    WBC 9 54 01/07/2019    HGB 10 1 (L) 01/07/2019    HCT 32 4 (L) 01/07/2019    MCV 97 01/07/2019     01/07/2019    MCH 30 2 01/07/2019    MCHC 31 2 (L) 01/07/2019    RDW 13 3 01/07/2019    MPV 10 0 01/07/2019    NRBC 0 01/07/2019   , CMP:   Lab Results   Component Value Date    SODIUM 148 (H) 01/07/2019    K 3 7 01/07/2019     (H) 01/07/2019    CO2 26 01/07/2019    BUN 33 (H) 01/07/2019    CREATININE 0 75 01/07/2019    CALCIUM 8 2 (L) 01/07/2019    EGFR 70 01/07/2019       Imaging and other studies: None today

## 2019-01-07 NOTE — UTILIZATION REVIEW
Continued Stay Review    Date: 1/7/2019    Vital Signs: BP (!) 172/81 (BP Location: Left arm)   Pulse 89   Temp 99 °F (37 2 °C) (Oral)   Resp 18   Ht 5' 1" (1 549 m)   Wt 46 4 kg (102 lb 4 7 oz)   SpO2 91%   BMI 19 33 kg/m²     Medications:   Scheduled Meds:   Current Facility-Administered Medications:  bisacodyl 10 mg Rectal Daily PRN    cefTRIAXone 1,000 mg Intravenous Q24H Last Rate: 1,000 mg (01/07/19 1354)   dextromethorphan-guaiFENesin 10 mL Oral Q4H PRN    guaiFENesin 1,200 mg Oral Q12H [facility]    heparin (porcine) 5,000 Units Subcutaneous Q12H [facility]    insulin lispro 1-5 Units Subcutaneous Q6H [facility]    levalbuterol 1 25 mg Nebulization TID    melatonin 3 mg Oral HS PRN    metroNIDAZOLE 500 mg Intravenous Q8H Last Rate: 500 mg (01/07/19 0538)   ondansetron 4 mg Intravenous Q4H PRN    pneumococcal 13-valent conjugate vaccine 0 5 mL Intramuscular Prior to discharge    potassium chloride 20 mEq Oral Once    senna-docusate sodium 1 tablet Oral BID    sodium chloride 3 mL Nebulization TID      Continuous Infusions:    PRN Meds: not used     Abnormal Labs/Diagnostic Results:   Na 148  Cl 113  Bun 33  Calcium 8 2  Wbc 9 54   hgb 10 1, hct 32 4  Phosphorous 2 2      Age/Sex: 80 y o  female     Assessment/Plan: per pulmonary - Acute hypoxic respiratory failure due to aspiration pneumonia/pneumonitis with worsening consolidation right lower lobe (abnormal chest x-ray)              Titrate FiO2 to maintain saturations greater than or equal to 88%  Out of bed as tolerated  Add flutter valve and incentive spirometer  May need vest therapy if cannot use flutter valve   Stop Cefepime now, start ceftriaxone 1gm q24hrs, D5/7 abx, cont flagyl              Increase Mucinex to 1200 milligrams twice daily  Continue Xopenex t i d  For pulmonary hygiene                Will need repeat chest x-ray in 4 to 6 weeks      Oral dysphagia with aspiration as above Aspiration precautions and modified diet with speech therapy following      Need for pneumococcal vaccine              PPV 13 this admission      Discharge Plan: PT recommends short term skilled PT

## 2019-01-07 NOTE — SPEECH THERAPY NOTE
Speech Language/Pathology    Speech/Language Pathology Progress Note    Patient Name: Carli Azar  Today's Date: 1/7/2019       Subjective:  Pt alert sitting upright in the chair with lunch tray present upon entering the room  Objective:  Pt seen for f/u dysphagia tx with dysphagia 2-mechanical lunch tray of chopped meatloaf, finely chopped carrots, mashed potatoes, pudding, and thin liquids  Pt with decreased and prolonged mastication of chopped solids with munch-like chew pattern and pocking of partially chewed material in L buccal cavity and on teeth with representative chew  Thin liquid wash only partially cleared bolus with pt continuing to chew- pt also had delayed/intermittent coughing when using thin liquid wash  Adequate oral control and transfer of pudding and thin liquids when not used as wash  Timely swallow  No coughing with thin liquids noted when sipped independently w/o food in oral cavity  Pt was frequently cued to attempt to remove food from L side of mouth and slow rate of intake but was minimally responsive to cues with poor carryover  Pt did not appear to understand/comprehend cues and reasoning for importance of instruction while eating  Assessment:  Pt presented with coughing only when drinking thin liquids with pocketed material in oral cavity- suspect mixed consistency and presence of oral residue impacted presence of coughing  Pt may require further downgrade to puree textures if poor carryover of instruction/cues continues (i e  Slowed rate, thorough mastication, lingual sweep for pocketing)       Plan/Recommendations:  -Continue dysphagia 2-mechanical with thin liquids  -Meds crushed  -Question need for downgrade to puree- will continue to follow pt

## 2019-01-07 NOTE — PLAN OF CARE
DISCHARGE PLANNING     Discharge to home or other facility with appropriate resources Progressing        DISCHARGE PLANNING - CARE MANAGEMENT     Discharge to post-acute care or home with appropriate resources Progressing        INFECTION - ADULT     Absence or prevention of progression during hospitalization Progressing        Knowledge Deficit     Patient/family/caregiver demonstrates understanding of disease process, treatment plan, medications, and discharge instructions Progressing        Nutrition/Hydration-ADULT     Nutrient/Hydration intake appropriate for improving, restoring or maintaining nutritional needs Progressing        PAIN - ADULT     Verbalizes/displays adequate comfort level or baseline comfort level Progressing        Potential for Falls     Patient will remain free of falls Progressing        Prexisting or High Potential for Compromised Skin Integrity     Skin integrity is maintained or improved Progressing        RESPIRATORY - ADULT     Achieves optimal ventilation and oxygenation Progressing

## 2019-01-07 NOTE — PROGRESS NOTES
Progress Note - Sheila Glover 1928, 80 y o  female MRN: [id number]    Unit/Bed#: -01 Encounter: [id number]    Primary Care Provider: No primary care provider on file  Date and time admitted to hospital: 2019 11:40 PM        Acute respiratory failure with hypoxia (HCC)   Assessment & Plan    /2 PNA  C/w Abx  Transition to PO within 24-48 hours and DC to ?rehab  * Severe sepsis [facility])   Assessment & Plan    Transferred from ICU to [facility] yesterday  2/2 PNA   On cefepime and flagyl  C/w IVABx  VTE Pharmacologic Prophylaxis:   Pharmacologic: Heparin  Mechanical VTE Prophylaxis in Place: Yes    Patient Centered Rounds: I have performed bedside rounds with nursing staff today  Discussions with Specialists or Other Care Team Provider: Discussed with Pulmonology today  C/w current management  Education and Discussions with Family / Patient: Discussed with patient today - did not speak with family  Time Spent for Care: 30 minutes  More than 50% of total time spent on counseling and coordination of care as described above  Current Length of Stay: 4 day(s)    Current Patient Status: Inpatient   Certification Statement: The patient will continue to require additional inpatient hospital stay due to IV ABx  Discharge Plan: Not medically stable for DC  Code Status: Level 3 - DNAR and DNI      Subjective:   Patient seen and examined      " I feel okay"        Objective:     Vitals:   Temp (24hrs), Av 4 °F (36 9 °C), Min:97 8 °F (36 6 °C), Max:99 °F (37 2 °C)    Temp:  [97 8 °F (36 6 °C)-99 °F (37 2 °C)] 98 1 °F (36 7 °C)  HR:  [84-93] 84  Resp:  [18] 18  BP: (133-172)/(68-81) 133/68  SpO2:  [91 %-99 %] 99 %  Body mass index is 19 33 kg/m²  Input and Output Summary (last 24 hours):        Intake/Output Summary (Last 24 hours) at 19 1620  Last data filed at 19 1300   Gross per 24 hour   Intake              480 ml   Output              450 ml   Net 30 ml       Physical Exam:     Physical Exam   Constitutional: She is oriented to person, place, and time  She appears well-developed  No distress  HENT:   Head: Normocephalic  Mouth/Throat: No oropharyngeal exudate  Eyes: Right eye exhibits no discharge  Left eye exhibits no discharge  No scleral icterus  Neck: No JVD present  No tracheal deviation present  No thyromegaly present  Cardiovascular: Normal rate  Exam reveals no gallop and no friction rub  No murmur heard  Pulmonary/Chest: Effort normal  No respiratory distress  She has no wheezes  She has no rales  She exhibits no tenderness  Crackles both bases  Abdominal: Soft  She exhibits no distension and no mass  There is no tenderness  There is no rebound and no guarding  Musculoskeletal: She exhibits no edema, tenderness or deformity  Lymphadenopathy:     She has no cervical adenopathy  Neurological: She is alert and oriented to person, place, and time  She displays normal reflexes  No cranial nerve deficit  She exhibits normal muscle tone  Coordination normal    Skin: Skin is warm  No rash noted  She is not diaphoretic  No erythema  No pallor  Psychiatric: She has a normal mood and affect           Additional Data:     Labs:      Results from last 7 days  Lab Units 01/07/19  0536 01/06/19  0456   WBC Thousand/uL 9 54 9 95   HEMOGLOBIN g/dL 10 1* 10 2*   HEMATOCRIT % 32 4* 32 0*   PLATELETS Thousands/uL 177 159   BANDS PCT %  --  2   NEUTROS PCT % 80*  --    LYMPHS PCT % 11*  --    LYMPHO PCT %  --  10*   MONOS PCT % 8  --    MONO PCT %  --  6   EOS PCT % 1 0       Results from last 7 days  Lab Units 01/07/19  0536  01/03/19  0013   SODIUM mmol/L 148*  < > 143   POTASSIUM mmol/L 3 7  < > 4 3   CHLORIDE mmol/L 113*  < > 106   CO2 mmol/L 26  < > 25   BUN mg/dL 33*  < > 41*   CREATININE mg/dL 0 75  < > 1 20   ANION GAP mmol/L 9  < > 12   CALCIUM mg/dL 8 2*  < > 8 7   ALBUMIN g/dL  --   --  3 6   TOTAL BILIRUBIN mg/dL  --   -- 0  50   ALK PHOS U/L  --   --  76   ALT U/L  --   --  30   AST U/L  --   --  24   GLUCOSE RANDOM mg/dL 105  < > 323*   < > = values in this interval not displayed  Results from last 7 days  Lab Units 01/02/19  2347   INR  1 09       Results from last 7 days  Lab Units 01/07/19  1217 01/07/19  0543 01/07/19  0003 01/06/19  1609 01/06/19  0555 01/06/19  0008 01/05/19  2021 01/05/19  1624 01/05/19  1154 01/05/19  0735 01/04/19  1622 01/04/19  1114   POC GLUCOSE mg/dl 118 95 115 95 87 92 112 136 119 111 109 104       Results from last 7 days  Lab Units 01/03/19  0424   HEMOGLOBIN A1C % 5 1       Results from last 7 days  Lab Units 01/05/19  0306 01/04/19  0326 01/03/19  0502 01/03/19  0424 01/03/19  0158 01/02/19  2347   LACTIC ACID mmol/L  --   --   --  2 0 3 1* 4 6*   PROCALCITONIN ng/ml 20 91* 29 31* 14 79*  --   --   --            * I Have Reviewed All Lab Data Listed Above  * Additional Pertinent Lab Tests Reviewed: Mindy 66 Admission Reviewed    Imaging:    Imaging Reports Reviewed Today Include:   CXR -   "No significant change in patchy bilateral densities   Small effusions  "  Imaging Personally Reviewed by Myself Includes:  As above  Recent Cultures (last 7 days):       Results from last 7 days  Lab Units 01/03/19  1133 01/03/19  0117 01/02/19  2347   BLOOD CULTURE   --  No Growth After 4 Days  No Growth After 4 Days     INFLUENZA B PCR  None Detected  --   --    RSV PCR  None Detected  --   --        Last 24 Hours Medication List:     Current Facility-Administered Medications:  bisacodyl 10 mg Rectal Daily PRN DISHA [place]    cefTRIAXone 1,000 mg Intravenous Q24H Fabiola Friedman DO Last Rate: 1,000 mg (01/07/19 1354)   dextromethorphan-guaiFENesin 10 mL Oral Q4H PRN Meena Schneider PA-C    guaiFENesin 1,200 mg Oral Q12H [facility] DISHA Delarosa    heparin (porcine) 5,000 Units Subcutaneous Q12H [facility] DISHA [place]    insulin lispro 1-5 Units Subcutaneous Q6H [address] DISHA [place]    levalbuterol 1 25 mg Nebulization TID Cony Andrews PA-C    melatonin 3 mg Oral HS PRN [facility] [place]    metroNIDAZOLE 500 mg Intravenous Q8H DISHA [place] Last Rate: 500 mg (01/07/19 6629)   ondansetron 4 mg Intravenous Q4H PRN DISHA Benavidez    pneumococcal 13-valent conjugate vaccine 0 5 mL Intramuscular Prior to discharge Jas Gegier MD    potassium chloride 20 mEq Oral Once DISHA [place]    senna-docusate sodium 1 tablet Oral BID DISHA [place]    sodium chloride 3 mL Nebulization TID Jas Geiger MD         Today, Patient Was Seen By: Julio Silva MD    ** Please Note: Dictation voice to text software may have been used in the creation of this document   **

## 2019-01-07 NOTE — PLAN OF CARE
Problem: PHYSICAL THERAPY ADULT  Goal: Performs mobility at highest level of function for planned discharge setting  See evaluation for individualized goals  Treatment/Interventions: Functional transfer training, LE strengthening/ROM, Therapeutic exercise, Endurance training, Cognitive reorientation, Patient/family training, Equipment eval/education, Bed mobility, Gait training, Spoke to MD, Spoke to nursing, Spoke to case management, Spoke to advanced practitioner, Family  Equipment Recommended: Mickie Fan       See flowsheet documentation for full assessment, interventions and recommendations  Outcome: Progressing  Prognosis: Fair  Problem List: Decreased strength, Decreased endurance, Impaired balance, Decreased mobility, Decreased safety awareness  Assessment: Patient impulsive and self transfering from EOB upon nursing and therapist entry to room  Patient demonstrated ability to transfer sit<>stand with consistent min ax 1 with multiple trials from EOB and recliner chair  Patient ambulated short distances with roller walker requiring mod a x1 and chair follow for safety  Patient requires assistance for steadying adn significant walker managment  Poor understanding of verbal instructions and demonstration provided for walker use  Easily distracted requiring redirection to task and for safety  Short simple commands with greater follow through with increased processing time  Arrival of lunch tray, limited patient further participation  Continue to focus on OOB mobility with progression of transfer and gait with roller walker as appropriate  Recommendation: Short-term skilled PT          See flowsheet documentation for full assessment

## 2019-01-08 LAB
ANION GAP SERPL CALCULATED.3IONS-SCNC: 7 MMOL/L (ref 4–13)
BACTERIA BLD CULT: NORMAL
BACTERIA BLD CULT: NORMAL
BASOPHILS # BLD AUTO: 0.05 THOUSANDS/ΜL (ref 0–0.1)
BASOPHILS NFR BLD AUTO: 1 % (ref 0–1)
BUN SERPL-MCNC: 34 MG/DL (ref 5–25)
CALCIUM SERPL-MCNC: 8.1 MG/DL (ref 8.3–10.1)
CHLORIDE SERPL-SCNC: 111 MMOL/L (ref 100–108)
CO2 SERPL-SCNC: 27 MMOL/L (ref 21–32)
CREAT SERPL-MCNC: 0.75 MG/DL (ref 0.6–1.3)
EOSINOPHIL # BLD AUTO: 0.14 THOUSAND/ΜL (ref 0–0.61)
EOSINOPHIL NFR BLD AUTO: 2 % (ref 0–6)
ERYTHROCYTE [DISTWIDTH] IN BLOOD BY AUTOMATED COUNT: 13.3 % (ref 11.6–15.1)
GFR SERPL CREATININE-BSD FRML MDRD: 70 ML/MIN/1.73SQ M
GLUCOSE SERPL-MCNC: 111 MG/DL (ref 65–140)
GLUCOSE SERPL-MCNC: 117 MG/DL (ref 65–140)
GLUCOSE SERPL-MCNC: 135 MG/DL (ref 65–140)
GLUCOSE SERPL-MCNC: 144 MG/DL (ref 65–140)
GLUCOSE SERPL-MCNC: 91 MG/DL (ref 65–140)
HCT VFR BLD AUTO: 32.5 % (ref 34.8–46.1)
HGB BLD-MCNC: 10.3 G/DL (ref 11.5–15.4)
IMM GRANULOCYTES # BLD AUTO: 0.07 THOUSAND/UL (ref 0–0.2)
IMM GRANULOCYTES NFR BLD AUTO: 1 % (ref 0–2)
LYMPHOCYTES # BLD AUTO: 1.28 THOUSANDS/ΜL (ref 0.6–4.47)
LYMPHOCYTES NFR BLD AUTO: 14 % (ref 14–44)
MCH RBC QN AUTO: 30.5 PG (ref 26.8–34.3)
MCHC RBC AUTO-ENTMCNC: 31.7 G/DL (ref 31.4–37.4)
MCV RBC AUTO: 96 FL (ref 82–98)
MONOCYTES # BLD AUTO: 0.69 THOUSAND/ΜL (ref 0.17–1.22)
MONOCYTES NFR BLD AUTO: 7 % (ref 4–12)
NEUTROPHILS # BLD AUTO: 7.09 THOUSANDS/ΜL (ref 1.85–7.62)
NEUTS SEG NFR BLD AUTO: 75 % (ref 43–75)
NRBC BLD AUTO-RTO: 0 /100 WBCS
PLATELET # BLD AUTO: 195 THOUSANDS/UL (ref 149–390)
PMV BLD AUTO: 9.7 FL (ref 8.9–12.7)
POTASSIUM SERPL-SCNC: 3.4 MMOL/L (ref 3.5–5.3)
RBC # BLD AUTO: 3.38 MILLION/UL (ref 3.81–5.12)
SODIUM SERPL-SCNC: 145 MMOL/L (ref 136–145)
WBC # BLD AUTO: 9.32 THOUSAND/UL (ref 4.31–10.16)

## 2019-01-08 PROCEDURE — 80048 BASIC METABOLIC PNL TOTAL CA: CPT | Performed by: INTERNAL MEDICINE

## 2019-01-08 PROCEDURE — 94668 MNPJ CHEST WALL SBSQ: CPT

## 2019-01-08 PROCEDURE — 97530 THERAPEUTIC ACTIVITIES: CPT

## 2019-01-08 PROCEDURE — 94760 N-INVAS EAR/PLS OXIMETRY 1: CPT

## 2019-01-08 PROCEDURE — 99232 SBSQ HOSP IP/OBS MODERATE 35: CPT | Performed by: INTERNAL MEDICINE

## 2019-01-08 PROCEDURE — 85025 COMPLETE CBC W/AUTO DIFF WBC: CPT | Performed by: INTERNAL MEDICINE

## 2019-01-08 PROCEDURE — 97116 GAIT TRAINING THERAPY: CPT

## 2019-01-08 PROCEDURE — 92526 ORAL FUNCTION THERAPY: CPT

## 2019-01-08 PROCEDURE — 94640 AIRWAY INHALATION TREATMENT: CPT

## 2019-01-08 PROCEDURE — 82948 REAGENT STRIP/BLOOD GLUCOSE: CPT

## 2019-01-08 RX ADMIN — METRONIDAZOLE 500 MG: 500 INJECTION, SOLUTION INTRAVENOUS at 22:33

## 2019-01-08 RX ADMIN — GUAIFENESIN 1200 MG: 600 TABLET, EXTENDED RELEASE ORAL at 08:56

## 2019-01-08 RX ADMIN — METRONIDAZOLE 500 MG: 500 INJECTION, SOLUTION INTRAVENOUS at 06:45

## 2019-01-08 RX ADMIN — LEVALBUTEROL HYDROCHLORIDE 1.25 MG: 1.25 SOLUTION, CONCENTRATE RESPIRATORY (INHALATION) at 14:36

## 2019-01-08 RX ADMIN — SENNOSIDES AND DOCUSATE SODIUM 1 TABLET: 8.6; 5 TABLET ORAL at 17:52

## 2019-01-08 RX ADMIN — GUAIFENESIN 1200 MG: 600 TABLET, EXTENDED RELEASE ORAL at 22:32

## 2019-01-08 RX ADMIN — ISODIUM CHLORIDE 3 ML: 0.03 SOLUTION RESPIRATORY (INHALATION) at 14:36

## 2019-01-08 RX ADMIN — LEVALBUTEROL HYDROCHLORIDE 1.25 MG: 1.25 SOLUTION, CONCENTRATE RESPIRATORY (INHALATION) at 19:10

## 2019-01-08 RX ADMIN — METRONIDAZOLE 500 MG: 500 INJECTION, SOLUTION INTRAVENOUS at 14:51

## 2019-01-08 RX ADMIN — LEVALBUTEROL HYDROCHLORIDE 1.25 MG: 1.25 SOLUTION, CONCENTRATE RESPIRATORY (INHALATION) at 07:16

## 2019-01-08 RX ADMIN — CEFTRIAXONE SODIUM 1000 MG: 10 INJECTION, POWDER, FOR SOLUTION INTRAVENOUS at 14:04

## 2019-01-08 RX ADMIN — HEPARIN SODIUM 5000 UNITS: 5000 INJECTION, SOLUTION INTRAVENOUS; SUBCUTANEOUS at 22:33

## 2019-01-08 RX ADMIN — ISODIUM CHLORIDE 3 ML: 0.03 SOLUTION RESPIRATORY (INHALATION) at 07:16

## 2019-01-08 RX ADMIN — ISODIUM CHLORIDE 3 ML: 0.03 SOLUTION RESPIRATORY (INHALATION) at 19:10

## 2019-01-08 RX ADMIN — HEPARIN SODIUM 5000 UNITS: 5000 INJECTION, SOLUTION INTRAVENOUS; SUBCUTANEOUS at 08:56

## 2019-01-08 NOTE — SPEECH THERAPY NOTE
Speech Language/Pathology    Speech/Language Pathology Progress Note    Patient Name: Osman Cortez  Today's Date: 1/8/2019       Subjective:  Pt seen for dysphagia tx at lunch  Pt sitting in chair  Able to self feed, dentures in place  Overall po intake has been poor  Objective:  Pt self feeding chopped pork and chopped broccoli; pt w/ decreased mastication- pocketing on R  After several bites, pt stated "I think I've had enough"- appeared fatigued w/ chewing  Meal tray removed, pt given mashed potatoes and applesauce- pt continued to eat, appeared less fatigued  Pt took a few sips of milk by cup, delayed cough noted x 2  No aspiration noted on VBS  Assessment:  Oral dysphagia-pocketing, fatigue, decreased mastication and decreased po intake noted w/ mech soft diet  Delayed cough w/ thin liquids      Plan/Recommendations:  rec down grade to puree w/ thin liquids  Cont aspiration precautions  Will cont to monitor for s/s aspiration w/ thin liquids

## 2019-01-08 NOTE — PHYSICAL THERAPY NOTE
PHYSICAL THERAPY NOTE    Patient Name: Lizzeth Amador  Today's Date: 1/8/2019 01/08/19 0848   Pain Assessment   Pain Assessment No/denies pain   Pain Score No Pain   Restrictions/Precautions   Weight Bearing Precautions Per Order No   Other Precautions Chair Alarm; Bed Alarm;Cognitive;Multiple lines;Telemetry;O2;Fall Risk   General   Family/Caregiver Present No   Subjective   Subjective Patient seated OOB in recliner and is agreeable to particiapte in therapy session  Patient identifers obtained from name & ID bracelet  Bed Mobility   Supine to Sit Unable to assess   Sit to Supine Unable to assess   Additional Comments Patient seated OOB in recliner pre and post session with chair alarm engaged, call bell and belongings in reach  Transfers   Sit to Stand 4  Minimal assistance   Additional items Assist x 1; Armrests; Increased time required;Verbal cues   Stand to Sit 4  Minimal assistance   Additional items Assist x 1; Armrests; Increased time required;Verbal cues   Ambulation/Elevation   Gait pattern Forward Flexion;Narrow JERI; Decreased foot clearance;Shuffling; Short stride; Excessively slow; Redundant gait at times; Inconsistent nima   Gait Assistance 3  Moderate assist   Additional items Assist x 1;Verbal cues   Assistive Device Rolling walker   Distance 70' x2, 15' x1   Balance   Static Sitting Fair   Dynamic Sitting Poor +   Static Standing Fair -   Dynamic Standing Poor   Ambulatory Poor   Endurance Deficit   Endurance Deficit Yes   Endurance Deficit Description limited ambulation distance   Activity Tolerance   Activity Tolerance Other (Comment); Patient limited by fatigue  (cognition)   Medical Staff Made Aware Spoke to TUCKER Jackson   Nurse Made Aware Spoke to Belkys Del Toro RN    Assessment   Prognosis Fair   Problem List Decreased strength;Decreased endurance; Impaired balance;Decreased mobility; Decreased safety awareness   Assessment Patient willing to participate in therapy session and eager to ambulate  Patient remains consistent at min ax1 for sit<>stand transfers requiring verbal instruction for hand placement and assistance for controlled descend and body positioning  Multiple standing trials with good tolerance x 2 minutes secondary to hygenie for bowel incontience  Patient demonstrated ability to ambulate increased gait distacne however continues to require signififacnt assistance for steadying and walker management  Patient with tendency to increase walker advancement with decreased step length causing LOB and increased assistance  Pateint with difficulty following directions limiting follow through during session  Continue to focus on OOB mobility tasks with progression of transfers and gait as appropriate  Goals   Patient Goals none stated   STG Expiration Date 01/13/19   Treatment Day 3   Plan   Treatment/Interventions Functional transfer training;LE strengthening/ROM; Therapeutic exercise; Endurance training;Cognitive reorientation;Patient/family training;Equipment eval/education; Bed mobility;Gait training   Progress Slow progress, decreased activity tolerance   PT Frequency 5x/wk  (and 1x weekend)   Recommendation   Recommendation Short-term skilled PT   Equipment Recommended Other (Comment)  (roller walker)       Sandy Black, PTA

## 2019-01-08 NOTE — PLAN OF CARE
Problem: PHYSICAL THERAPY ADULT  Goal: Performs mobility at highest level of function for planned discharge setting  See evaluation for individualized goals  Treatment/Interventions: Functional transfer training, LE strengthening/ROM, Therapeutic exercise, Endurance training, Cognitive reorientation, Patient/family training, Equipment eval/education, Bed mobility, Gait training, Spoke to MD, Spoke to nursing, Spoke to case management, Spoke to advanced practitioner, Family  Equipment Recommended: Darrius Albright       See flowsheet documentation for full assessment, interventions and recommendations  Outcome: Progressing  Prognosis: Fair  Problem List: Decreased strength, Decreased endurance, Impaired balance, Decreased mobility, Decreased safety awareness  Assessment: Patient willing to participate in therapy session and eager to ambulate  Patient remains consistent at min ax1 for sit<>stand transfers requiring verbal instruction for hand placement and assistance for controlled descend and body positioning  Multiple standing trials with good tolerance x 2 minutes secondary to hygenie for bowel incontience  Patient demonstrated ability to ambulate increased gait distacne however continues to require signififacnt assistance for steadying and walker management  Patient with tendency to increase walker advancement with decreased step length causing LOB and increased assistance  Pateint with difficulty following directions limiting follow through during session  Continue to focus on OOB mobility tasks with progression of transfers and gait as appropriate  Recommendation: Short-term skilled PT          See flowsheet documentation for full assessment

## 2019-01-08 NOTE — PROGRESS NOTES
Progress Note - Pulmonary   Sheila Glover 89 y o  female MRN: [id number]  Unit/Bed#: -01 Encounter: [id number]    Assessment/Plan:    Acute hypoxic respiratory failure (resolved) due to aspiration pneumonia/pneumonitis with worsening consolidation right lower lobe (abnormal chest x-ray)              Titrate FiO2 to maintain saturations greater than or equal to 88%  Check ambulatory pulse ox  Out of bed as tolerated  Continue flutter valve and incentive spirometer  Continue ceftriaxone (or oral equivalent) and Flagyl to complete seven day course  Continue Mucinex  Continue Xopenex t i d  for pulmonary hygiene while inpatient  Will need repeat chest x-ray in 4 to 6 weeks      Oral dysphagia with aspiration as above              Aspiration precautions and modified diet with speech therapy following      Need for pneumococcal vaccine              PPV 13 this admission      Outpatient follow-up and repeat imaging as per D/C instructions  Discussed with primary team   Will sign off  Please call with further questions or concerns  Chief Complaint:    "I feel good I guess "    Subjective:    Sheila is feeling well  She is sitting out of bed in the chair  She denies any shortness of breath  She has an occasional loose cough, but otherwise unchanged  She denies hemoptysis  She denies any other complaints  Objective:    Vitals: Blood pressure 149/70, pulse 77, temperature 98 1 °F (36 7 °C), resp  rate 18, height 5' 1" (1 549 m), weight 46 5 kg (102 lb 8 2 oz), SpO2 95 %  3L NC,Body mass index is 19 37 kg/m²        Intake/Output Summary (Last 24 hours) at 01/08/19 0932  Last data filed at 01/08/19 0405   Gross per 24 hour   Intake              180 ml   Output              151 ml   Net               29 ml       Invasive Devices     Peripheral Intravenous Line            Peripheral IV 01/04/19 Left Wrist 4 days    Peripheral IV 01/08/19 Left Antecubital less than 1 day              Physical Exam:     Physical Exam   Constitutional: She appears well-developed and well-nourished  She is cooperative  Non-toxic appearance  No distress  HENT:   Head: Normocephalic and atraumatic  Mouth/Throat: No oropharyngeal exudate  Eyes: EOM are normal  No scleral icterus  Neck: Neck supple  No JVD present  No tracheal deviation present  Cardiovascular: Normal rate, regular rhythm, S1 normal and S2 normal   Exam reveals no gallop and no friction rub  No murmur heard  Pulmonary/Chest: Effort normal and breath sounds normal  No accessory muscle usage or stridor  No tachypnea  No respiratory distress  She has no decreased breath sounds  She has no wheezes  She has no rhonchi  She has no rales  She exhibits no tenderness  Abdominal: Soft  Bowel sounds are normal  She exhibits no distension  There is no tenderness  There is no rebound and no guarding  Musculoskeletal: She exhibits no edema  Neurological: She is alert  She has normal strength  No sensory deficit  GCS eye subscore is 4  GCS verbal subscore is 5  GCS motor subscore is 6  Skin: Skin is warm and dry  No abrasion, no ecchymosis, no lesion and no rash noted  She is not diaphoretic  No cyanosis or erythema  Psychiatric: Her speech is normal    Vitals reviewed        Labs:   CBC:   Lab Results   Component Value Date    WBC 9 32 01/08/2019    HGB 10 3 (L) 01/08/2019    HCT 32 5 (L) 01/08/2019    MCV 96 01/08/2019     01/08/2019    MCH 30 5 01/08/2019    MCHC 31 7 01/08/2019    RDW 13 3 01/08/2019    MPV 9 7 01/08/2019    NRBC 0 01/08/2019   , CMP:   Lab Results   Component Value Date    SODIUM 145 01/08/2019    K 3 4 (L) 01/08/2019     (H) 01/08/2019    CO2 27 01/08/2019    BUN 34 (H) 01/08/2019    CREATININE 0 75 01/08/2019    CALCIUM 8 1 (L) 01/08/2019    EGFR 70 01/08/2019     Imaging and other studies: None today

## 2019-01-08 NOTE — SOCIAL WORK
Pt and family want to take pt home at DC with [facility]  Pt recommends [facility]  CM will follow up with family and confirm DC plan tomorrow  [facility] has accepted pt   Family will transport home

## 2019-01-09 LAB
ANION GAP SERPL CALCULATED.3IONS-SCNC: 6 MMOL/L (ref 4–13)
BASOPHILS # BLD AUTO: 0.04 THOUSANDS/ΜL (ref 0–0.1)
BASOPHILS NFR BLD AUTO: 1 % (ref 0–1)
BUN SERPL-MCNC: 33 MG/DL (ref 5–25)
CALCIUM SERPL-MCNC: 8.3 MG/DL (ref 8.3–10.1)
CHLORIDE SERPL-SCNC: 112 MMOL/L (ref 100–108)
CO2 SERPL-SCNC: 28 MMOL/L (ref 21–32)
CREAT SERPL-MCNC: 0.73 MG/DL (ref 0.6–1.3)
EOSINOPHIL # BLD AUTO: 0.21 THOUSAND/ΜL (ref 0–0.61)
EOSINOPHIL NFR BLD AUTO: 3 % (ref 0–6)
ERYTHROCYTE [DISTWIDTH] IN BLOOD BY AUTOMATED COUNT: 13.3 % (ref 11.6–15.1)
GFR SERPL CREATININE-BSD FRML MDRD: 73 ML/MIN/1.73SQ M
GLUCOSE SERPL-MCNC: 107 MG/DL (ref 65–140)
GLUCOSE SERPL-MCNC: 113 MG/DL (ref 65–140)
GLUCOSE SERPL-MCNC: 114 MG/DL (ref 65–140)
GLUCOSE SERPL-MCNC: 115 MG/DL (ref 65–140)
GLUCOSE SERPL-MCNC: 126 MG/DL (ref 65–140)
GLUCOSE SERPL-MCNC: 96 MG/DL (ref 65–140)
HCT VFR BLD AUTO: 30.9 % (ref 34.8–46.1)
HGB BLD-MCNC: 9.8 G/DL (ref 11.5–15.4)
IMM GRANULOCYTES # BLD AUTO: 0.07 THOUSAND/UL (ref 0–0.2)
IMM GRANULOCYTES NFR BLD AUTO: 1 % (ref 0–2)
LYMPHOCYTES # BLD AUTO: 1.44 THOUSANDS/ΜL (ref 0.6–4.47)
LYMPHOCYTES NFR BLD AUTO: 17 % (ref 14–44)
MCH RBC QN AUTO: 30.4 PG (ref 26.8–34.3)
MCHC RBC AUTO-ENTMCNC: 31.7 G/DL (ref 31.4–37.4)
MCV RBC AUTO: 96 FL (ref 82–98)
MONOCYTES # BLD AUTO: 0.64 THOUSAND/ΜL (ref 0.17–1.22)
MONOCYTES NFR BLD AUTO: 8 % (ref 4–12)
NEUTROPHILS # BLD AUTO: 5.96 THOUSANDS/ΜL (ref 1.85–7.62)
NEUTS SEG NFR BLD AUTO: 70 % (ref 43–75)
NRBC BLD AUTO-RTO: 0 /100 WBCS
PLATELET # BLD AUTO: 219 THOUSANDS/UL (ref 149–390)
PMV BLD AUTO: 10.1 FL (ref 8.9–12.7)
POTASSIUM SERPL-SCNC: 3.6 MMOL/L (ref 3.5–5.3)
RBC # BLD AUTO: 3.22 MILLION/UL (ref 3.81–5.12)
SODIUM SERPL-SCNC: 146 MMOL/L (ref 136–145)
WBC # BLD AUTO: 8.36 THOUSAND/UL (ref 4.31–10.16)

## 2019-01-09 PROCEDURE — 94760 N-INVAS EAR/PLS OXIMETRY 1: CPT

## 2019-01-09 PROCEDURE — 80048 BASIC METABOLIC PNL TOTAL CA: CPT | Performed by: INTERNAL MEDICINE

## 2019-01-09 PROCEDURE — 94640 AIRWAY INHALATION TREATMENT: CPT

## 2019-01-09 PROCEDURE — 85025 COMPLETE CBC W/AUTO DIFF WBC: CPT | Performed by: INTERNAL MEDICINE

## 2019-01-09 PROCEDURE — 82948 REAGENT STRIP/BLOOD GLUCOSE: CPT

## 2019-01-09 PROCEDURE — 94668 MNPJ CHEST WALL SBSQ: CPT

## 2019-01-09 PROCEDURE — 92526 ORAL FUNCTION THERAPY: CPT

## 2019-01-09 PROCEDURE — 99232 SBSQ HOSP IP/OBS MODERATE 35: CPT | Performed by: INTERNAL MEDICINE

## 2019-01-09 RX ADMIN — LEVALBUTEROL HYDROCHLORIDE 1.25 MG: 1.25 SOLUTION, CONCENTRATE RESPIRATORY (INHALATION) at 19:09

## 2019-01-09 RX ADMIN — ISODIUM CHLORIDE 3 ML: 0.03 SOLUTION RESPIRATORY (INHALATION) at 19:09

## 2019-01-09 RX ADMIN — GUAIFENESIN 1200 MG: 600 TABLET, EXTENDED RELEASE ORAL at 20:49

## 2019-01-09 RX ADMIN — GUAIFENESIN 1200 MG: 600 TABLET, EXTENDED RELEASE ORAL at 09:56

## 2019-01-09 RX ADMIN — METRONIDAZOLE 500 MG: 500 INJECTION, SOLUTION INTRAVENOUS at 05:17

## 2019-01-09 RX ADMIN — ISODIUM CHLORIDE 3 ML: 0.03 SOLUTION RESPIRATORY (INHALATION) at 08:03

## 2019-01-09 RX ADMIN — METRONIDAZOLE 500 MG: 500 INJECTION, SOLUTION INTRAVENOUS at 14:29

## 2019-01-09 RX ADMIN — HEPARIN SODIUM 5000 UNITS: 5000 INJECTION, SOLUTION INTRAVENOUS; SUBCUTANEOUS at 20:49

## 2019-01-09 RX ADMIN — ISODIUM CHLORIDE 3 ML: 0.03 SOLUTION RESPIRATORY (INHALATION) at 13:09

## 2019-01-09 RX ADMIN — SENNOSIDES AND DOCUSATE SODIUM 1 TABLET: 8.6; 5 TABLET ORAL at 09:56

## 2019-01-09 RX ADMIN — LEVALBUTEROL HYDROCHLORIDE 1.25 MG: 1.25 SOLUTION, CONCENTRATE RESPIRATORY (INHALATION) at 13:09

## 2019-01-09 RX ADMIN — LEVALBUTEROL HYDROCHLORIDE 1.25 MG: 1.25 SOLUTION, CONCENTRATE RESPIRATORY (INHALATION) at 08:03

## 2019-01-09 RX ADMIN — METRONIDAZOLE 500 MG: 500 INJECTION, SOLUTION INTRAVENOUS at 22:48

## 2019-01-09 RX ADMIN — CEFTRIAXONE SODIUM 1000 MG: 10 INJECTION, POWDER, FOR SOLUTION INTRAVENOUS at 13:15

## 2019-01-09 RX ADMIN — HEPARIN SODIUM 5000 UNITS: 5000 INJECTION, SOLUTION INTRAVENOUS; SUBCUTANEOUS at 09:58

## 2019-01-09 NOTE — PLAN OF CARE
Problem: DISCHARGE PLANNING - CARE MANAGEMENT  Goal: Discharge to post-acute care or home with appropriate resources  INTERVENTIONS:  - Conduct assessment to determine patient/family and health care team treatment goals, and need for post-acute services based on payer coverage, community resources, and patient preferences, and barriers to discharge  - Address psychosocial, clinical, and financial barriers to discharge as identified in assessment in conjunction with the patient/family and health care team  - Arrange appropriate level of post-acute services according to patient's   needs and preference and payer coverage in collaboration with the physician and health care team  - Communicate with and update the patient/family, physician, and health care team regarding progress on the discharge plan  - Arrange appropriate transportation to post-acute venues   Outcome: Progressing  LOS 6 DAYS  GMLOS 4 9  Per previous CM notes, patient is refusing SNF and has been setup with [facility]  Per SLIM rounds this morning and this afternoon  Patient is not medically stable for discharge at this time due to mental statues and sodium  SLIM will make contact with family and determined mental statues is currently accurate prior to discharge  Tentative d c erynw  CM will continue to follow through discharge

## 2019-01-09 NOTE — SOCIAL WORK
LOS 6 DAYS  GMLOS 4 9  Per previous CM notes, patient is refusing SNF and has been setup with [facility]  Per SLIM rounds this morning and this afternoon  Patient is not medically stable for discharge at this time due to mental statues and sodium  SLIM will make contact with family and determined mental statues is currently accurate prior to discharge  Tentative d c tmw  CM will continue to follow through discharge

## 2019-01-09 NOTE — PROGRESS NOTES
[facility] Progress Note  Patient: Sheila Glover 89 y o  female   MRN: [id number]  PCP: No primary care provider on file  Unit/Bed#: -Lino Encounter: [id number]  Date Of Visit: 01/08/19    Assessment:    Principal Problem:    Severe sepsis ([address] )  Active Problems:    Acute respiratory failure with hypoxia (HCC)    Aspiration of gastric contents    NSTEMI (non-ST elevated myocardial infarction) (HCC)    Hyperglycemia    Onychomycosis of toenail    Acute metabolic encephalopathy    Leukocytosis    Hypernatremia      Plan:    · Aspiration Pneumonia with Sepsis and Acute Hypoxic Respiratory Failure POA -   · Antibiotics - Rocephin / Flagyl  Can change to PO at discharge  · Mucolytic - Mucinex 1200 mg bid  · Nebulizer - Xopenex tid  · Oxygenation / ventilation - Determine baseline oxygenation  · Incentive spirometer  Continue bronchial hygiene maneuvers  · Repeat Chest Xray in 4-6 weeks  Follow up with pulmonary as outpatient  · Pneumococcal vaccine this admission  · Oropharyngeal Dysphagia -   · Diet - Pureed with thin liquids currently  · Monitor PO intake  · Speech pathology following  · Acute Toxic / Metabolic Encephalopathy POA - Will need to determine baseline from patient's family  Will discuss with family  Serial exams  Treating for pneumonia  · NSTEMI type 2 - due to hypoxia / sepsis  No further testing / workup needed  · YOVANY POA - resolved  · Hypernatremia - resolved  BMP in am   · Ambulatory Dysfunction - recommended for inpatient rehab, but declined this recommendation opting for VNA services instead  VTE Pharmacologic Prophylaxis:   Pharmacologic: Heparin  Mechanical VTE Prophylaxis in Place: Yes    Patient Centered Rounds: I have performed bedside rounds with nursing staff today  Discussions with Specialists or Other Care Team Provider: Nikkie GAUTHIER with pulmonary group    Education and Discussions with Family / Patient: Patient only      Time Spent for Care: 30 minutes  More than 50% of total time spent on counseling and coordination of care as described above  Current Length of Stay: 5 day(s)    Current Patient Status: Inpatient   Certification Statement: The patient will continue to require additional inpatient hospital stay due to evaluation and treatment for above medical conditions  Discharge Plan / Estimated Discharge Date: to be determined  Code Status: Level 3 - DNAR and DNI      Subjective: The patient has no acute complaints currently  She is unsure if she normally wears oxygen  The patient has no dyspnea currently  The patient denies any pain symptoms including any abdominal pain  She also denies any nausea  Patient was seen by speech pathology today who did recommend scaling back the food component of the diet from mechanical soft dysphagia 2 diet to a pureed dysphagia 1 diet  Objective:     Vitals:   Temp (24hrs), Av 5 °F (36 9 °C), Min:98 1 °F (36 7 °C), Max:99 1 °F (37 3 °C)    Temp:  [98 1 °F (36 7 °C)-99 1 °F (37 3 °C)] 98 2 °F (36 8 °C)  HR:  [77-82] 82  Resp:  [18] 18  BP: (138-149)/(65-72) 148/72  SpO2:  [91 %-98 %] 94 %  Body mass index is 19 37 kg/m²  Input and Output Summary (last 24 hours): Intake/Output Summary (Last 24 hours) at 19 1921  Last data filed at 19 1800   Gross per 24 hour   Intake              220 ml   Output              301 ml   Net              -81 ml       Physical Exam:     Physical Exam   Constitutional: No distress  Seems to be a little bit hard of hearing  HENT:   Mouth/Throat: Oropharynx is clear and moist  No oropharyngeal exudate  Eyes: Pupils are equal, round, and reactive to light  Conjunctivae are normal    Neck: No JVD present  Cardiovascular: Normal rate, regular rhythm and intact distal pulses  Pulmonary/Chest: Effort normal    Coarse breath sounds throughout with mild to moderate right sided rhonchi  Wet sounding cough  Abdominal: Soft  Bowel sounds are normal  She exhibits no distension  There is no tenderness  Musculoskeletal: She exhibits no edema  Lymphadenopathy:     She has no cervical adenopathy  Neurological:   The patient is able to tell me that she is at the hospital but not which 1  She is not able to tell me the month (incorrectly guesses June) or the year (no guess given)   Skin: Skin is warm and dry  No rash noted  Vitals reviewed  Additional Data:     Labs:      Results from last 7 days  Lab Units 01/08/19  0521   WBC Thousand/uL 9 32   HEMOGLOBIN g/dL 10 3*   HEMATOCRIT % 32 5*   PLATELETS Thousands/uL 195   NEUTROS PCT % 75   LYMPHS PCT % 14   MONOS PCT % 7   EOS PCT % 2       Results from last 7 days  Lab Units 01/08/19  0521  01/03/19  0013 01/03/19  0003   POTASSIUM mmol/L 3 4*  < > 4 3  --    CHLORIDE mmol/L 111*  < > 106  --    CO2 mmol/L 27  < > 25  --    CO2, I-STAT mmol/L  --   --   --  24   BUN mg/dL 34*  < > 41*  --    CREATININE mg/dL 0 75  < > 1 20  --    CALCIUM mg/dL 8 1*  < > 8 7  --    ALK PHOS U/L  --   --  76  --    ALT U/L  --   --  30  --    AST U/L  --   --  24  --    GLUCOSE, ISTAT mg/dl  --   --   --  341*   < > = values in this interval not displayed  Results from last 7 days  Lab Units 01/02/19  2347   INR  1 09       * I Have Reviewed All Lab Data Listed Above  * Additional Pertinent Lab Tests Reviewed: All Labs For Current Hospital Admission Reviewed    Imaging:    Imaging Reports Reviewed Today Include: all imaging for this admission  Imaging Personally Reviewed by Myself Includes:  None    Recent Cultures (last 7 days):       Results from last 7 days  Lab Units 01/03/19  1133 01/03/19  0117 01/02/19  2347   BLOOD CULTURE   --  No Growth After 5 Days  No Growth After 5 Days     INFLUENZA B PCR  None Detected  --   --    RSV PCR  None Detected  --   --        Last 24 Hours Medication List:     Current Facility-Administered Medications:  bisacodyl 10 mg Rectal Daily PRN DISHA eHnsley cefTRIAXone 1,000 mg Intravenous Q24H Jarett Youssef DO Last Rate: 1,000 mg (01/08/19 1404)   dextromethorphan-guaiFENesin 10 mL Oral Q4H PRN Sindhu Escalera PA-C    guaiFENesin 1,200 mg Oral Q12H [facility] Rhonda ChongheimDISHA    heparin (porcine) 5,000 Units Subcutaneous Q12H [facility] DISHA [place]    insulin lispro 1-5 Units Subcutaneous Q6H [facility] DISHA [place]    levalbuterol 1 25 mg Nebulization TID Sindhu Escalera PA-C    melatonin 3 mg Oral HS PRN DISHA [place]    metroNIDAZOLE 500 mg Intravenous Q8H [facility], CRDEREK Last Rate: 500 mg (01/08/19 1451)   ondansetron 4 mg Intravenous Q4H PRN [facility], DISHA    pneumococcal 13-valent conjugate vaccine 0 5 mL Intramuscular Prior to discharge Tanvir Sy MD    potassium chloride 20 mEq Oral Once DISHA [place]    senna-docusate sodium 1 tablet Oral BID [facility], DISHA    sodium chloride 3 mL Nebulization TID Tanvir Sy MD         Today, Patient Was Seen By: Yogesh Bales DO    ** Please Note: This note has been constructed using a voice recognition system   **

## 2019-01-09 NOTE — SPEECH THERAPY NOTE
Speech Language/Pathology    Speech/Language Pathology Progress Note    Patient Name: Candelario Boucher  Today's Date: 1/9/2019       Subjective:  Pt seen at lunch on puree w/ thin liquids  On RA  Sitting in recliner, stated she is tired today, but able to self feed  Objective:  Pt ate 1/4 tsp bites of pureed  [place] [employer] mashed potatoes, carrots and pudding  Pt drank thin liquids by cup  Pt w/ slow but adequate manipulation and transfer  No oral residue or pocketing  Pt w/ good oral control of liquids by cup  Swallows were  Complete  Pt w/ intermittent, delayed moist cough throughout session, moreso after puree than thin liquids  Voice remained clear  Assessment:  Pt w/ decreased oral dysphagia symptoms, although po intake not much improved, pt c/o being tired today and doesn't feel like eating  Suspect intermittent delayed cough not related to aspiration risk  Plan/Recommendations:  Pt tent for discharge today to home w/ family, pt could tolerate finely chopped minced -pureed foods at home  Cont meds in applesauce

## 2019-01-10 VITALS
RESPIRATION RATE: 16 BRPM | SYSTOLIC BLOOD PRESSURE: 135 MMHG | HEART RATE: 82 BPM | HEIGHT: 61 IN | BODY MASS INDEX: 19.19 KG/M2 | OXYGEN SATURATION: 93 % | DIASTOLIC BLOOD PRESSURE: 66 MMHG | TEMPERATURE: 98.2 F | WEIGHT: 101.63 LBS

## 2019-01-10 LAB
ANION GAP SERPL CALCULATED.3IONS-SCNC: 7 MMOL/L (ref 4–13)
BUN SERPL-MCNC: 32 MG/DL (ref 5–25)
CALCIUM SERPL-MCNC: 8.1 MG/DL (ref 8.3–10.1)
CHLORIDE SERPL-SCNC: 112 MMOL/L (ref 100–108)
CO2 SERPL-SCNC: 27 MMOL/L (ref 21–32)
CREAT SERPL-MCNC: 0.83 MG/DL (ref 0.6–1.3)
ERYTHROCYTE [DISTWIDTH] IN BLOOD BY AUTOMATED COUNT: 13.7 % (ref 11.6–15.1)
GFR SERPL CREATININE-BSD FRML MDRD: 62 ML/MIN/1.73SQ M
GLUCOSE SERPL-MCNC: 108 MG/DL (ref 65–140)
GLUCOSE SERPL-MCNC: 116 MG/DL (ref 65–140)
GLUCOSE SERPL-MCNC: 118 MG/DL (ref 65–140)
GLUCOSE SERPL-MCNC: 91 MG/DL (ref 65–140)
GLUCOSE SERPL-MCNC: 97 MG/DL (ref 65–140)
HCT VFR BLD AUTO: 30.5 % (ref 34.8–46.1)
HGB BLD-MCNC: 9.7 G/DL (ref 11.5–15.4)
MCH RBC QN AUTO: 30.5 PG (ref 26.8–34.3)
MCHC RBC AUTO-ENTMCNC: 31.8 G/DL (ref 31.4–37.4)
MCV RBC AUTO: 96 FL (ref 82–98)
PLATELET # BLD AUTO: 249 THOUSANDS/UL (ref 149–390)
PMV BLD AUTO: 9.6 FL (ref 8.9–12.7)
POTASSIUM SERPL-SCNC: 3.6 MMOL/L (ref 3.5–5.3)
RBC # BLD AUTO: 3.18 MILLION/UL (ref 3.81–5.12)
SODIUM SERPL-SCNC: 146 MMOL/L (ref 136–145)
WBC # BLD AUTO: 7.24 THOUSAND/UL (ref 4.31–10.16)

## 2019-01-10 PROCEDURE — 94668 MNPJ CHEST WALL SBSQ: CPT

## 2019-01-10 PROCEDURE — 99232 SBSQ HOSP IP/OBS MODERATE 35: CPT | Performed by: INTERNAL MEDICINE

## 2019-01-10 PROCEDURE — 90670 PCV13 VACCINE IM: CPT | Performed by: INTERNAL MEDICINE

## 2019-01-10 PROCEDURE — 94760 N-INVAS EAR/PLS OXIMETRY 1: CPT

## 2019-01-10 PROCEDURE — 99239 HOSP IP/OBS DSCHRG MGMT >30: CPT | Performed by: INTERNAL MEDICINE

## 2019-01-10 PROCEDURE — 80048 BASIC METABOLIC PNL TOTAL CA: CPT | Performed by: INTERNAL MEDICINE

## 2019-01-10 PROCEDURE — 85027 COMPLETE CBC AUTOMATED: CPT | Performed by: INTERNAL MEDICINE

## 2019-01-10 PROCEDURE — 97110 THERAPEUTIC EXERCISES: CPT

## 2019-01-10 PROCEDURE — 97530 THERAPEUTIC ACTIVITIES: CPT

## 2019-01-10 PROCEDURE — 82948 REAGENT STRIP/BLOOD GLUCOSE: CPT

## 2019-01-10 PROCEDURE — 94640 AIRWAY INHALATION TREATMENT: CPT

## 2019-01-10 PROCEDURE — 97116 GAIT TRAINING THERAPY: CPT

## 2019-01-10 PROCEDURE — G0009 ADMIN PNEUMOCOCCAL VACCINE: HCPCS | Performed by: INTERNAL MEDICINE

## 2019-01-10 RX ORDER — GUAIFENESIN 1200 MG/1
600 TABLET, EXTENDED RELEASE ORAL EVERY 12 HOURS SCHEDULED
Qty: 14 TABLET | Refills: 0 | Status: SHIPPED | OUTPATIENT
Start: 2019-01-10 | End: 2019-01-24

## 2019-01-10 RX ORDER — AMOXICILLIN 250 MG
1 CAPSULE ORAL DAILY PRN
Qty: 15 TABLET | Refills: 0 | Status: SHIPPED | OUTPATIENT
Start: 2019-01-10

## 2019-01-10 RX ADMIN — LEVALBUTEROL HYDROCHLORIDE 1.25 MG: 1.25 SOLUTION, CONCENTRATE RESPIRATORY (INHALATION) at 07:25

## 2019-01-10 RX ADMIN — HEPARIN SODIUM 5000 UNITS: 5000 INJECTION, SOLUTION INTRAVENOUS; SUBCUTANEOUS at 09:57

## 2019-01-10 RX ADMIN — METRONIDAZOLE 500 MG: 500 INJECTION, SOLUTION INTRAVENOUS at 05:48

## 2019-01-10 RX ADMIN — LEVALBUTEROL HYDROCHLORIDE 1.25 MG: 1.25 SOLUTION, CONCENTRATE RESPIRATORY (INHALATION) at 13:16

## 2019-01-10 RX ADMIN — PNEUMOCOCCAL 13-VALENT CONJUGATE VACCINE 0.5 ML: 2.2; 2.2; 2.2; 2.2; 2.2; 4.4; 2.2; 2.2; 2.2; 2.2; 2.2; 2.2; 2.2 INJECTION, SUSPENSION INTRAMUSCULAR at 17:28

## 2019-01-10 RX ADMIN — SENNOSIDES AND DOCUSATE SODIUM 1 TABLET: 8.6; 5 TABLET ORAL at 09:57

## 2019-01-10 RX ADMIN — ISODIUM CHLORIDE 3 ML: 0.03 SOLUTION RESPIRATORY (INHALATION) at 07:25

## 2019-01-10 RX ADMIN — ISODIUM CHLORIDE 3 ML: 0.03 SOLUTION RESPIRATORY (INHALATION) at 13:17

## 2019-01-10 RX ADMIN — GUAIFENESIN 1200 MG: 600 TABLET, EXTENDED RELEASE ORAL at 09:57

## 2019-01-10 NOTE — DISCHARGE SUMMARY
Discharge Summary - [facility] Internal Medicine    Patient Information: Sheila Glover 89 y o  female MRN: [id number]  Unit/Bed#: -01 Encounter: [id number]    Discharging Physician / Practitioner: Joel Donis DO  PCP: No primary care provider on file  Admission Date: 1/2/2019  Discharge Date: 01/10/19    Disposition:     Home     Reason for Admission: Unresponsive with agonal breathing    Discharge Diagnoses:     Principal Problem:    Severe sepsis ([facility] [address] )  Active Problems:    Acute respiratory failure with hypoxia (HCC)    Aspiration of gastric contents    NSTEMI (non-ST elevated myocardial infarction) ([facility] [address] )    Hyperglycemia    Onychomycosis of toenail    Acute metabolic encephalopathy    Leukocytosis    Hypernatremia  Resolved Problems:    Lactic acidosis      Consultations During Hospital Stay:  · Podiatry    Procedures Performed:     · Toenail debridement     Significant Findings / Test Results:     · Chest x-ray 01/02/2019 - faint nodular airspace opacities in the right lower lung zone suspicious for pneumonia  · CT of the chest 01/03/2019 - patchy bilateral consolidative airspace disease throughout the lungs, consistent with infectious process  Findings may be secondary to aspiration given markedly fluid distended stomach  · CT of the head on 01/03/2019 - no acute intracranial abnormality  · Chest x-ray 01/04/2019 - multifocal airspace opacities, increased since the prior chest radiograph on 01/02/2019  · Chest x-ray 01/05/2019 - no significant change from prior x-ray  There are patchy bilateral densities noted  Small pleural effusions noted  · Blood cultures negative x2 for 5 days  · Influenza and RSV PCR testing were all negative  · MRSA cultures negative  Incidental Findings:   · Initially white blood cell count was elevated in the range of 13  However, it normalized down to 9 95 on 01/06/2019    On the day of discharge white blood cell count is 7 24   · Hemoglobin on admission was 13 3 with hematocrit of 43 2  There was a gradual decline in the hemoglobin during the admission  However there is no obvious bleeding  Hemoglobin has been stable in the high 9s and low 10s since 01/05/2019  On discharge hemoglobin is 9 7 with hematocrit of 30 5  · Metabolic profiles had a BUN of 41 and creatinine 1 20 on admission  By the following day these were improving  By discharge the BUN is 32 with creatinine is 0 83  The patient's sodium has ranged anywhere from 143-148 during this admission  At discharge sodium was 146  · Ammonia level was normal   · LFTs within normal limits  · Troponin elevated at 0 37 on admission  Alejandro as high as 1 25   · D-dimer on admission was 3559  · Hemoglobin A1c was 5 1%  ·  TSH was 2 758  · ASA and Acetaminophen levels negative on admission  Alcohol level also negative on admission  · Urinalysis had negative nitrites small occult blood and 1+ protein  · Procalcitonin initially was 14 79 and alejandro as high as 29 31 during this admission  Test Results Pending at Discharge (will require follow up): · None     Outpatient Tests Requested:  · CBC and BMP within 5 days - Copy to PCP    Complications:  None    Admission History:    Sheila Arroyo is a 80 y o  female who presents to the ER after being found at home unresponsive with agonal breathing  Per family and patient she started with episodes of vomiting yesterday evening  During 1 such episode she was in the bathroom vomiting and when family went to check on her she was unresponsive leaning against the toilet and agonal breathing  When EMS arrived her SpO2 was 50% on room air, she was placed on CPAP with improvement to 90%  In the ER she was placed on BiPAP  CT scan chest abdomen pelvis head was done:  No acute intracranial abnormalities seen; no PE, patchy bilateral consolidations and markedly fluid distended stomach  Blood cultures were obtained and she was given Zosyn and vancomycin    Pertinent labs: Lactic acid of 4 6, troponin 0 09, white count 13 11, creatinine 1 2, blood sugar of 323, and D-dimer of 3559  Patient was given a L of fluid in the ER and lactic acid improved to 3 1  She was also given Zofran and Tylenol for fever of 100 4  Patient does not regularly see a primary care, has not seen a PCP and 15 years  She has a medical history of skin cancer which was removed surgically 15 years ago  She only takes vitamins at home  Family does report that they have noticed over last few years her mental status has been declining, she asked repeating questions and she asks where her  is who  25 years ago    Hospital Course:     Sheila Ribeiro is a 80 y o  female patient who originally presented to the hospital on 2019 after being found down unresponsive at home with agonal breathing  The patient was admitted to the intensive care unit on the critical care service and was placed on BiPAP for the hypoxic respiratory failure  The patient was placed on IV antibiotics including cefepime and Flagyl for coverage of aspiration respiratory illness  The patient had received 7 total days of antibiotic treatment during this hospitalization  The patient did meet severe sepsis criteria with an elevated lactic acid level on admission  The patient did have hypoxia after coming off the BiPAP and did require high-flow nasal cannula  However, by 2019 the patient was able to transition over to regular standard nasal cannula  Was on 2019 that the patient was transferred out to the medicine service  It was determined that the patient had oropharyngeal dysphagia and recommended diet had been a pureed diet with thin liquids  Speech pathology continued to fall the patient and maintain this diet recommendation  In terms of the encephalopathy, the patient was felt to be back towards baseline at the time of discharge    It was felt that the encephalopathy was related to the infection and hypoxia itself  The patient did have an elevated troponin during this admission as noted above  This was felt to be type 2 NSTEMI from the hypoxia and sepsis  No further workup or testing was needed  The patient did have acute kidney injury on admission but this also resolved with IV fluids as did the hypernatremia that was also present earlier in the admission  In terms of infection treatment the patient was treated with antibiotics which were eventually transitioned over to Rocephin and Flagyl part way through the hospital course  Patient would also continue on Mucinex 1200 mg twice daily, Xopenex t i d  And pulmonary toilet with incentive spirometer and flutter valve  Patient was followed by pulmonary team during this hospitalization recommendation was made for a repeat chest x-ray in 4-6 weeks as well as follow up with the pulmonary team in the outpatient setting  In terms of ambulatory dysfunction, recommendation was made for inpatient rehab, but patient / family have declined this recommendation preferring to go home instead  VNA services will be set up  Walker prescription is provided  Condition at Discharge: stable     Discharge Day Visit / Exam:     * Please refer to separate progress note for these details *    Discussion with Family: updated daughter Maude Cha via phone today  Discharge instructions/Information to patient and family:   See after visit summary for information provided to patient and family  Provisions for Follow-Up Care:  See after visit summary for information related to follow-up care and any pertinent home health orders  Planned Readmission: None    Discharge Statement:  I spent 45 minutes discharging the patient  This time was spent on the day of discharge  I had direct contact with the patient on the day of discharge   Greater than 50% of the total time was spent examining patient, answering all patient questions, arranging and discussing plan of care with patient as well as directly providing post-discharge instructions  Additional time then spent on discharge activities  Discharge Medications:  See after visit summary for reconciled discharge medications provided to patient and family  ** Please Note: This note has been constructed using a voice recognition system   **

## 2019-01-10 NOTE — PHYSICAL THERAPY NOTE
PHYSICAL THERAPY NOTE    Patient Name: Ming Rivas  Today's Date: 1/10/2019        01/10/19 1523   Pain Assessment   Pain Assessment No/denies pain   Pain Score No Pain   Restrictions/Precautions   Weight Bearing Precautions Per Order No   Other Precautions Chair Alarm; Bed Alarm;Cognitive;Multiple lines;Telemetry;O2;Fall Risk   General   Family/Caregiver Present No   Subjective   Subjective Patient seated OOB in recliner and is agreeable to therapy session  Patient identifers obtained from name & ID bracelet  Bed Mobility   Supine to Sit Unable to assess   Sit to Supine Unable to assess   Additional Comments Patient seated OOB in recliner pre and post session with chair alarm engaged, call bell and belongings in reach  Transfers   Sit to Stand 4  Minimal assistance   Additional items Assist x 1; Armrests; Increased time required;Verbal cues   Stand to Sit 4  Minimal assistance   Additional items Assist x 1; Armrests; Increased time required;Verbal cues  (controlled descend)   Ambulation/Elevation   Gait pattern Forward Flexion;Narrow JERI; Decreased foot clearance;Shuffling; Short stride; Excessively slow; Redundant gait at times; Inconsistent nima   Gait Assistance 3  Moderate assist   Additional items Assist x 1;Verbal cues; Tactile cues   Assistive Device Rolling walker   Distance 115' x1   Balance   Static Sitting Fair   Dynamic Sitting Fair -   Static Standing Fair -   Dynamic Standing Poor +   Ambulatory Poor   Endurance Deficit   Endurance Deficit Yes   Endurance Deficit Description limited activity tolerance and ambulation distance   Activity Tolerance   Activity Tolerance Patient limited by fatigue   Nurse Made Aware Spoke to DUSTY Ray    Exercises   Knee AROM Long Arc Quad Sitting;10 reps;AROM; Bilateral   Ankle Pumps Sitting;10 reps;AROM; Bilateral   Marching Sitting;10 reps;AROM; Bilateral   Assessment   Prognosis Fair   Problem List Decreased strength;Decreased endurance; Impaired balance;Decreased mobility; Decreased safety awareness   Assessment Patient demonstrated ability to transfer sit to stand from recliner with consistent min ax 1 requiring vebral instruction for technique and safe hand placement as well as assistance for controlled descend into chair  Patient demonstrated ability to ambulate increased gait distance with roller walker and mod a x1  Patient continues to require significant assistance for ambulation for steadying and walker control  Improvement from previous session noted with staying in bounds of walker and balance with directional changes  Addtional assistance required for obstacle avoidance and safety  Patient participated in seated B LE exercise program with poor understanding requiring input for form, pace and repeititions  Continue to focus on OOB mobility progression with emphasis on transfers and ambulation as appropriate  Goals   Patient Goals none stated   STG Expiration Date 01/13/19   Treatment Day 4   Plan   Treatment/Interventions Functional transfer training;LE strengthening/ROM; Therapeutic exercise; Endurance training;Cognitive reorientation;Patient/family training;Equipment eval/education; Bed mobility;Gait training   Progress Progressing toward goals   PT Frequency 5x/wk  (and 1x weekend)   Recommendation   Recommendation Short-term skilled PT   Equipment Recommended Other (Comment)  (walker)       Alyx Hoover, PTA

## 2019-01-10 NOTE — PLAN OF CARE
Problem: PHYSICAL THERAPY ADULT  Goal: Performs mobility at highest level of function for planned discharge setting  See evaluation for individualized goals  Treatment/Interventions: Functional transfer training, LE strengthening/ROM, Therapeutic exercise, Endurance training, Cognitive reorientation, Patient/family training, Equipment eval/education, Bed mobility, Gait training, Spoke to MD, Spoke to nursing, Spoke to case management, Spoke to advanced practitioner, Family  Equipment Recommended: Gayle Socks       See flowsheet documentation for full assessment, interventions and recommendations  Outcome: Progressing  Prognosis: Fair  Problem List: Decreased strength, Decreased endurance, Impaired balance, Decreased mobility, Decreased safety awareness  Assessment: Patient demonstrated ability to transfer sit to stand from recliner with consistent min ax 1 requiring vebral instruction for technique and safe hand placement as well as assistance for controlled descend into chair  Patient demonstrated ability to ambulate increased gait distance with roller walker and mod a x1  Patient continues to require significant assistance for ambulation for steadying and walker control  Improvement from previous session noted with staying in bounds of walker and balance with directional changes  Addtional assistance required for obstacle avoidance and safety  Patient participated in seated B LE exercise program with poor understanding requiring input for form, pace and repeititions  Continue to focus on OOB mobility progression with emphasis on transfers and ambulation as appropriate  Recommendation: Short-term skilled PT          See flowsheet documentation for full assessment

## 2019-01-10 NOTE — PROGRESS NOTES
[facility] Progress Note  Patient: Sheila Glover 89 y o  female   MRN: [id number]  PCP: No primary care provider on file  Unit/Bed#: -01 Encounter: [id number]  Date Of Visit: 01/10/19    Assessment:    Principal Problem:    Severe sepsis ([address] )  Active Problems:    Acute respiratory failure with hypoxia (HCC)    Aspiration of gastric contents    NSTEMI (non-ST elevated myocardial infarction) (HCC)    Hyperglycemia    Onychomycosis of toenail    Acute metabolic encephalopathy    Leukocytosis    Hypernatremia      Plan:    · Aspiration Pneumonia with Sepsis and Acute Hypoxic Respiratory Failure POA -   · Antibiotics - Rocephin / Flagyl  Can change to PO at discharge  · Mucolytic - Mucinex 1200 mg bid  · Nebulizer - Xopenex tid  · Oxygenation / ventilation - Doing OK on room air currently  · Incentive spirometer  Continue bronchial hygiene maneuvers  · Repeat Chest Xray in 4-6 weeks  Follow up with pulmonary as outpatient  · Pneumococcal vaccine this admission  · Oropharyngeal Dysphagia -   · Diet - Pureed with thin liquids currently  · Monitor PO intake  Encourage fluids  · Speech pathology following  · Acute Toxic / Metabolic Encephalopathy POA - Will need to determine baseline from patient's family  Will discuss with family  Serial exams  Treating for pneumonia  · NSTEMI type 2 - due to hypoxia / sepsis  No further testing / workup needed  · YOVANY POA - resolved  · Hypernatremia - slight rise in sodium today  Will encourage PO fluids and monitor BMPs  · Ambulatory Dysfunction - recommended for inpatient rehab, but declined this recommendation opting for VNA services instead  VTE Pharmacologic Prophylaxis:   Pharmacologic: Heparin  Mechanical VTE Prophylaxis in Place: Yes    Patient Centered Rounds: I have performed bedside rounds with nursing staff today      Discussions with Specialists or Other Care Team Provider: None today    Education and Discussions with Family / Patient: Attempted to update daughter, Makayla Gutierrez via phone, but no answer  Left brief voicemail and callback number  Time Spent for Care: 30 minutes  More than 50% of total time spent on counseling and coordination of care as described above  Current Length of Stay: 7 day(s)    Current Patient Status: Inpatient   Certification Statement: The patient will continue to require additional inpatient hospital stay due to evaluation and treatment for above medical conditions  Discharge Plan / Estimated Discharge Date: to be determined  Code Status: Level 3 - DNAR and DNI      Subjective: The patient has no acute complaints today  Nursing states that she has been drinking but needs a lot of encouragement to drink fluids  The patient is eating okay  No nausea or vomiting  The patient has no abdominal pain  No dyspnea  No coughing  The patient otherwise feels well  She continues to remain disoriented  Objective:     Vitals:   Temp (24hrs), Av 8 °F (37 1 °C), Min:98 6 °F (37 °C), Max:99 1 °F (37 3 °C)    Temp:  [98 6 °F (37 °C)-99 1 °F (37 3 °C)] 98 6 °F (37 °C)  HR:  [] 75  Resp:  [16] 16  BP: (132-168)/(73-85) 158/80  SpO2:  [91 %-94 %] 93 %  Body mass index is 19 2 kg/m²  Input and Output Summary (last 24 hours): Intake/Output Summary (Last 24 hours) at 01/10/19 1356  Last data filed at 01/10/19 1043   Gross per 24 hour   Intake          2826 67 ml   Output              950 ml   Net          1876 67 ml       Physical Exam:     Physical Exam   Constitutional: No distress  Somewhat hard of hearing  HENT:   Mouth/Throat: No oropharyngeal exudate  Dry oral mucosa still  Eyes: Pupils are equal, round, and reactive to light  Cardiovascular: Normal rate, regular rhythm and intact distal pulses  No murmur heard  Pulmonary/Chest: Effort normal    Decreased breath sounds  No coughing today  Abdominal: Soft  Bowel sounds are normal  She exhibits no distension  There is no tenderness  Musculoskeletal: She exhibits no edema  Neurological:   The patient is unable to tell me the month, year, or current location  She does follow simple commands easily  Motor strength is 5/5 to all extremities and overall exam is nonfocal   No obvious dysarthria or aphasia  Skin: Skin is warm and dry  Vitals reviewed  Additional Data:     Labs:      Results from last 7 days  Lab Units 01/10/19  0504 01/09/19  0507   WBC Thousand/uL 7 24 8 36   HEMOGLOBIN g/dL 9 7* 9 8*   HEMATOCRIT % 30 5* 30 9*   PLATELETS Thousands/uL 249 219   NEUTROS PCT %  --  70   LYMPHS PCT %  --  17   MONOS PCT %  --  8   EOS PCT %  --  3       Results from last 7 days  Lab Units 01/10/19  0504   POTASSIUM mmol/L 3 6   CHLORIDE mmol/L 112*   CO2 mmol/L 27   BUN mg/dL 32*   CREATININE mg/dL 0 83   CALCIUM mg/dL 8 1*           * I Have Reviewed All Lab Data Listed Above  * Additional Pertinent Lab Tests Reviewed:  All Labs Within Last 24 Hours Reviewed    Imaging:    Imaging Reports Reviewed Today Include: no new imaging  Imaging Personally Reviewed by Myself Includes:  None    Recent Cultures (last 7 days):           Last 24 Hours Medication List:     Current Facility-Administered Medications:  bisacodyl 10 mg Rectal Daily PRN DISHA Carolina   dextromethorphan-guaiFENesin 10 mL Oral Q4H PRN Lonza Ion, PA-C   guaiFENesin 1,200 mg Oral Q12H [facility] DISHA Mack   heparin (porcine) 5,000 Units Subcutaneous Q12H [facility] DISHA [place]   insulin lispro 1-5 Units Subcutaneous Q6H [facility] DISHA [place]   levalbuterol 1 25 mg Nebulization TID Lonza Ion, PA-C   melatonin 3 mg Oral HS PRN DISHA Carolina   ondansetron 4 mg Intravenous Q4H PRN DISHA Mclaughlin   pneumococcal 13-valent conjugate vaccine 0 5 mL Intramuscular Prior to discharge Gypsy Sharif MD   potassium chloride 20 mEq Oral Once DISHA Carolina   senna-docusate sodium 1 tablet Oral BID DISHA Venegas   sodium chloride 3 mL Nebulization TID Justice Orosco MD        Today, Patient Was Seen By: Tameka Ward DO    ** Please Note: This note has been constructed using a voice recognition system   **

## 2019-01-10 NOTE — DISCHARGE INSTRUCTIONS
· Drink plenty of fluids to keep well hydrated  · All food should be pureed in their consistency  · Please have blood work recheck next week  · Follow-up with her primary care physician within 1 week  If he did not have a primary care physician please establish with a primary care physician using the [address] information provided separately  · Follow-up of the lung doctor as noted in the follow-up section  · Use walker when ambulating  Work with therapy to improve mobility

## 2019-01-10 NOTE — PROGRESS NOTES
[facility] Progress Note  Patient: Sheila Glover 89 y o  female   MRN: [id number]  PCP: No primary care provider on file  Unit/Bed#: -[facility] Encounter: [id number]  Date Of Visit: 01/09/19    Assessment:    Principal Problem:    Severe sepsis ([address] )  Active Problems:    Acute respiratory failure with hypoxia (HCC)    Aspiration of gastric contents    NSTEMI (non-ST elevated myocardial infarction) (HCC)    Hyperglycemia    Onychomycosis of toenail    Acute metabolic encephalopathy    Leukocytosis    Hypernatremia      Plan:    · Aspiration Pneumonia with Sepsis and Acute Hypoxic Respiratory Failure POA -   · Antibiotics - Rocephin / Flagyl  Can change to PO at discharge  · Mucolytic - Mucinex 1200 mg bid  · Nebulizer - Xopenex tid  · Oxygenation / ventilation - Doing OK on room air currently  · Incentive spirometer  Continue bronchial hygiene maneuvers  · Repeat Chest Xray in 4-6 weeks  Follow up with pulmonary as outpatient  · Pneumococcal vaccine this admission  · Oropharyngeal Dysphagia -   · Diet - Pureed with thin liquids currently  · Monitor PO intake  · Speech pathology following  · Acute Toxic / Metabolic Encephalopathy POA - Will need to determine baseline from patient's family  Will discuss with family  Serial exams  Treating for pneumonia  · NSTEMI type 2 - due to hypoxia / sepsis  No further testing / workup needed  · YOVANY POA - resolved  · Hypernatremia - slight rise in sodium today  Will encourage PO fluids and recheck BMP in am   · Ambulatory Dysfunction - recommended for inpatient rehab, but declined this recommendation opting for VNA services instead  VTE Pharmacologic Prophylaxis:   Pharmacologic: Heparin  Mechanical VTE Prophylaxis in Place: Yes    Patient Centered Rounds: I have performed bedside rounds with nursing staff today      Discussions with Specialists or Other Care Team Provider: None today    Education and Discussions with Family / Patient: Patient only  Time Spent for Care: 30 minutes  More than 50% of total time spent on counseling and coordination of care as described above  Current Length of Stay: 6 day(s)    Current Patient Status: Inpatient   Certification Statement: The patient will continue to require additional inpatient hospital stay due to evaluation and treatment for above medical conditions  Discharge Plan / Estimated Discharge Date: to be determined  Code Status: Level 3 - DNAR and DNI      Subjective:   No acute events in last 24 hours  Patient offers no acute complaints  Nursing was able to wean oxygen to room air  The patient is with continued cough  No fevers or chills  Objective:     Vitals:   Temp (24hrs), Av 8 °F (37 1 °C), Min:98 4 °F (36 9 °C), Max:99 1 °F (37 3 °C)    Temp:  [98 4 °F (36 9 °C)-99 1 °F (37 3 °C)] 99 1 °F (37 3 °C)  HR:  [] 100  Resp:  [16-18] 16  BP: (132-165)/(78-99) 132/85  SpO2:  [91 %-95 %] 94 %  Body mass index is 19 2 kg/m²  Input and Output Summary (last 24 hours): Intake/Output Summary (Last 24 hours) at 198  Last data filed at 19 1437   Gross per 24 hour   Intake         28932 67 ml   Output              600 ml   Net         56077 67 ml       Physical Exam:     Physical Exam   Constitutional: No distress  Somewhat hard of hearing  HENT:   Mouth/Throat: No oropharyngeal exudate  Slightly dry oral mucosa  Eyes: Pupils are equal, round, and reactive to light  Cardiovascular: Normal rate, regular rhythm and intact distal pulses  No murmur heard  Pulmonary/Chest: Effort normal    Coarse breath sounds throughout with mild to moderate right sided rhonchi  Wet sounding cough  Abdominal: Soft  Bowel sounds are normal  She exhibits no distension  There is no tenderness  Musculoskeletal: She exhibits no edema  Neurological:   No changes in orientation today compared to yesterday  Skin: Skin is warm and dry     Vitals reviewed  Additional Data:     Labs:      Results from last 7 days  Lab Units 01/09/19  0507   WBC Thousand/uL 8 36   HEMOGLOBIN g/dL 9 8*   HEMATOCRIT % 30 9*   PLATELETS Thousands/uL 219   NEUTROS PCT % 70   LYMPHS PCT % 17   MONOS PCT % 8   EOS PCT % 3       Results from last 7 days  Lab Units 01/09/19  0507  01/03/19  0013 01/03/19  0003   POTASSIUM mmol/L 3 6  < > 4 3  --    CHLORIDE mmol/L 112*  < > 106  --    CO2 mmol/L 28  < > 25  --    CO2, I-STAT mmol/L  --   --   --  24   BUN mg/dL 33*  < > 41*  --    CREATININE mg/dL 0 73  < > 1 20  --    CALCIUM mg/dL 8 3  < > 8 7  --    ALK PHOS U/L  --   --  76  --    ALT U/L  --   --  30  --    AST U/L  --   --  24  --    GLUCOSE, ISTAT mg/dl  --   --   --  341*   < > = values in this interval not displayed  Results from last 7 days  Lab Units 01/02/19  2347   INR  1 09       * I Have Reviewed All Lab Data Listed Above  * Additional Pertinent Lab Tests Reviewed: All Labs Within Last 24 Hours Reviewed    Imaging:    Imaging Reports Reviewed Today Include: no new imaging  Imaging Personally Reviewed by Myself Includes:  None    Recent Cultures (last 7 days):       Results from last 7 days  Lab Units 01/03/19  1133 01/03/19  0117 01/02/19  2347   BLOOD CULTURE   --  No Growth After 5 Days  No Growth After 5 Days     INFLUENZA B PCR  None Detected  --   --    RSV PCR  None Detected  --   --        Last 24 Hours Medication List:     Current Facility-Administered Medications:  bisacodyl 10 mg Rectal Daily PRN DISHA Carolina    dextromethorphan-guaiFENesin 10 mL Oral Q4H PRN Christopher Hayden PA-C    guaiFENesin 1,200 mg Oral Q12H [facility] Doraine Meigs, CRNP    heparin (porcine) 5,000 Units Subcutaneous Q12H [facility] DISHA [place]    insulin lispro 1-5 Units Subcutaneous Q6H [facility] DISHA [place]    levalbuterol 1 25 mg Nebulization TID Christopher SANIA Hayden    melatonin 3 mg Oral HS PRN DISHA [place]    metroNIDAZOLE 500 mg Intravenous Q8H Cinderella Side, CRNP Last Rate: 500 mg (01/09/19 7420)   ondansetron 4 mg Intravenous Q4H PRN Cinderella Side, CRNP    pneumococcal 13-valent conjugate vaccine 0 5 mL Intramuscular Prior to discharge Tavnir Sy MD    potassium chloride 20 mEq Oral Once DISHA [place]    senna-docusate sodium 1 tablet Oral BID Cinderella Side, CRNP    sodium chloride 3 mL Nebulization TID Tanvir Sy MD         Today, Patient Was Seen By: Yogesh Bales DO    ** Please Note: This note has been constructed using a voice recognition system   **

## 2019-01-14 ENCOUNTER — TRANSITIONAL CARE MANAGEMENT (OUTPATIENT)
Dept: FAMILY MEDICINE CLINIC | Facility: CLINIC | Age: 84
End: 2019-01-14

## 2019-01-15 ENCOUNTER — LAB REQUISITION (OUTPATIENT)
Dept: LAB | Facility: HOSPITAL | Age: 84
End: 2019-01-15
Payer: MEDICARE

## 2019-01-15 DIAGNOSIS — J18.9 PNEUMONIA: ICD-10-CM

## 2019-01-15 LAB
ANION GAP SERPL CALCULATED.3IONS-SCNC: 8 MMOL/L (ref 4–13)
BUN SERPL-MCNC: 15 MG/DL (ref 5–25)
CALCIUM SERPL-MCNC: 8.5 MG/DL (ref 8.3–10.1)
CHLORIDE SERPL-SCNC: 106 MMOL/L (ref 100–108)
CO2 SERPL-SCNC: 28 MMOL/L (ref 21–32)
CREAT SERPL-MCNC: 0.77 MG/DL (ref 0.6–1.3)
ERYTHROCYTE [DISTWIDTH] IN BLOOD BY AUTOMATED COUNT: 14 % (ref 11.6–15.1)
GFR SERPL CREATININE-BSD FRML MDRD: 68 ML/MIN/1.73SQ M
GLUCOSE SERPL-MCNC: 88 MG/DL (ref 65–140)
HCT VFR BLD AUTO: 36.8 % (ref 34.8–46.1)
HGB BLD-MCNC: 11.8 G/DL (ref 11.5–15.4)
MCH RBC QN AUTO: 30.9 PG (ref 26.8–34.3)
MCHC RBC AUTO-ENTMCNC: 32.1 G/DL (ref 31.4–37.4)
MCV RBC AUTO: 96 FL (ref 82–98)
PLATELET # BLD AUTO: 475 THOUSANDS/UL (ref 149–390)
PMV BLD AUTO: 9.1 FL (ref 8.9–12.7)
POTASSIUM SERPL-SCNC: 4.3 MMOL/L (ref 3.5–5.3)
RBC # BLD AUTO: 3.82 MILLION/UL (ref 3.81–5.12)
SODIUM SERPL-SCNC: 142 MMOL/L (ref 136–145)
WBC # BLD AUTO: 10.8 THOUSAND/UL (ref 4.31–10.16)

## 2019-01-15 PROCEDURE — 80048 BASIC METABOLIC PNL TOTAL CA: CPT | Performed by: FAMILY MEDICINE

## 2019-01-15 PROCEDURE — 85027 COMPLETE CBC AUTOMATED: CPT | Performed by: FAMILY MEDICINE

## 2019-01-24 ENCOUNTER — OFFICE VISIT (OUTPATIENT)
Dept: FAMILY MEDICINE CLINIC | Facility: CLINIC | Age: 84
End: 2019-01-24
Payer: MEDICARE

## 2019-01-24 VITALS
TEMPERATURE: 95.7 F | BODY MASS INDEX: 17.18 KG/M2 | HEIGHT: 61 IN | OXYGEN SATURATION: 97 % | DIASTOLIC BLOOD PRESSURE: 80 MMHG | SYSTOLIC BLOOD PRESSURE: 130 MMHG | HEART RATE: 91 BPM | WEIGHT: 91 LBS

## 2019-01-24 DIAGNOSIS — J69.0 ASPIRATION PNEUMONIA OF BOTH LUNGS DUE TO GASTRIC SECRETIONS, UNSPECIFIED PART OF LUNG (HCC): ICD-10-CM

## 2019-01-24 DIAGNOSIS — D72.829 LEUKOCYTOSIS, UNSPECIFIED TYPE: ICD-10-CM

## 2019-01-24 DIAGNOSIS — IMO0001 TRANSITION OF CARE PERFORMED WITH SHARING OF CLINICAL SUMMARY: Primary | ICD-10-CM

## 2019-01-24 PROCEDURE — 99495 TRANSJ CARE MGMT MOD F2F 14D: CPT | Performed by: FAMILY MEDICINE

## 2019-01-24 NOTE — PROGRESS NOTES
FAMILY MEDICINE TRANSITION OF CARE OFFICE VISIT  [facility] - [facility]    NAME: Sheila Glover  AGE: 80 y o  SEX: female  : 1928       DATE: 2019    Assessment and Plan     Diagnoses and all orders for this visit:    Transition of care performed with sharing of clinical summary    Aspiration pneumonia of both lungs due to gastric secretions, unspecified part of lung (HCC)  -     CBC and differential; Future    Leukocytosis, unspecified type  -     CBC and differential; Future      1  Transition of care performed with sharing of clinical summary        2  Aspiration pneumonia of both lungs due to gastric secretions, unspecified part of lung (HCC)  Clinically improved, cough is improving  Patient has stopped taking Mucinex  Has an order to repeat chest x-ray to follow up on pneumonia  - CBC and differential; Future    3  Leukocytosis, unspecified type  New onset, recheck CBC x 4 weeks  - CBC and differential; Future  Family sees definite improvement in her symptoms  States they  feel supported  with the visiting nurse  Will fup if symptoms change     - Counseling Documentation: patient was counseled regarding: diagnostic results, instructions for management, risk factor reductions, prognosis, patient and family education, impressions, risks and benefits of treatment options and importance of compliance with treatment    Transitional Care Management Review     Sheila Blanc is a 80 y o  female here for TCM follow-up    During the TCM phone call patient stated:    TCM Call (since 2018)     Date and time call was made  2019 11:30 AM    Hospital care reviewed  Records reviewed    Patient was hospitialized at  [address] [place] [address]    Date of Admission  18    Date of discharge  01/10/18    Diagnosis  ASPIRATION PNEUMONIA    Disposition  Home    Were the patients medications reviewed and updated  Yes      TCM Call (since 2018)     Post hospital issues None    Should patient be enrolled in anticoag monitoring? No    Scheduled for follow up? Yes    Did you obtain your prescribed medications  Yes    Do you need help managing your prescriptions or medications  No    Is transportation to your appointment needed  No    I have advised the patient to call PCP with any new or worsening symptoms  sree felton ma    Living Arrangements  Family members    Support System  Family    Do you have social support  Yes, as much as I need    Are you recieving any outpatient services  Yes    [facility]    Are you recieving home care services  Yes    Types of home care services  Nurse visit    Are you using any community resources  No    Current waiver services  No    Have you fallen in the last 12 months  Yes    How many times  Rumeir Cali 182 (1)    Interperter language line needed  No          History of Present Illness     [facility]  Sheila Connor Weeks is a 80 y o  female patient who originally presented to the hospital on 1/2/2019 after being found down unresponsive at home with agonal breathing  The patient was admitted to the intensive care unit on the critical care service and was placed on BiPAP for the hypoxic respiratory failure  The patient was placed on IV antibiotics including cefepime and Flagyl for coverage of aspiration respiratory illness  The patient had received 7 total days of antibiotic treatment during this hospitalization  Patient was followed by pulmonary team during this hospitalization recommendation was made for a repeat chest x-ray in 4-6 weeks  Patient was discharged home  Family does not have any concerns since the patient was discharged  They have visiting nurses following up on her  Her appetite has improved  Patient continues to eat pureed diet    She does have follow-up with his speech therapist   Full  The following portions of the patient's history were reviewed and updated as appropriate: allergies, current medications, past family history, past medical history, past social history, past surgical history and problem list     Review of Systems       Review of Systems   Constitutional: Negative  HENT: Negative  Eyes: Negative  Respiratory: Negative  Cardiovascular: Negative  Gastrointestinal: Negative  Endocrine: Negative  Genitourinary: Negative  Musculoskeletal: Negative  Skin: Negative  Neurological: Negative  Psychiatric/Behavioral: Negative  Active Problem List     Patient Active Problem List   Diagnosis    Acute respiratory failure with hypoxia (HCC)    Severe sepsis (HCC)    Aspiration of gastric contents    NSTEMI (non-ST elevated myocardial infarction) (HCC)    Hyperglycemia    Onychomycosis of toenail    Acute metabolic encephalopathy    Leukocytosis    Hypernatremia    Transition of care performed with sharing of clinical summary       Objective     /80   Pulse 104   Temp (!) 95 7 °F (35 4 °C)   Ht 5' 1" (1 549 m)   Wt 41 3 kg (91 lb)   SpO2 94%   BMI 17 19 kg/m²   Repeat pulse 91, SpO2 97 %  Physical Exam   Constitutional: She is oriented to person, place, and time  She appears well-developed and well-nourished  HENT:   Head: Normocephalic  Right Ear: External ear normal    Left Ear: External ear normal    Eyes: Pupils are equal, round, and reactive to light  Neck: Normal range of motion  Neck supple  Cardiovascular: Normal rate and regular rhythm  Pulmonary/Chest: Effort normal  She has rales (B/l Basal)  Abdominal: Soft  Bowel sounds are normal    Musculoskeletal: Normal range of motion  Neurological: She is alert and oriented to person, place, and time  Psychiatric: She has a normal mood and affect  Her behavior is normal  Judgment normal        Laboratory Results: I have personally reviewed the pertinent laboratory results/reports     Radiology/Other Diagnostic Testing Results: I have personally reviewed pertinent reports  Xr Chest 1 View Portable    Result Date: 1/3/2019  CHEST INDICATION:   hypoxia, AMS  COMPARISON:  None EXAM PERFORMED/VIEWS:  XR CHEST PORTABLE FINDINGS: Heart shadow appears unremarkable  Uncoiled aorta  Atherosclerotic vascular calcifications are noted  Faint, nodular airspace opacities in the right lower lung zone suspicious for pneumonia  No pneumothorax or pleural effusion  Osseous structures appear within normal limits for patient age  Thoracolumbar scoliosis  Faint, nodular airspace opacities in the right lower lung zone suspicious for pneumonia  Workstation performed: [id number]     Ct Head Without Contrast    Result Date: 1/3/2019  CT BRAIN - WITHOUT CONTRAST INDICATION:   AMS  COMPARISON:  None  TECHNIQUE:  CT examination of the brain was performed  In addition to axial images, coronal 2D reformatted images were created and submitted for interpretation  Radiation dose length product (DLP) for this visit:  989 mGy-cm   This examination, like all CT scans performed in the [facility], was performed utilizing techniques to minimize radiation dose exposure, including the use of iterative reconstruction and automated exposure control  IMAGE QUALITY:  Diagnostic  FINDINGS: PARENCHYMA: Decreased attenuation is noted in periventricular and subcortical white matter demonstrating an appearance that is statistically most likely to represent moderate microangiopathic change  Chronic lacunar infarction(s) are noted in basal ganglia  No CT signs of acute infarction  No intracranial mass, mass effect or midline shift  No acute parenchymal hemorrhage  VENTRICLES AND EXTRA-AXIAL SPACES:  Enlargement of ventricles and extra-axial CSF spaces, out of proportion to the patient's age most consistent with cerebral and cerebellar atrophy  VISUALIZED ORBITS AND PARANASAL SINUSES:  Unremarkable   CALVARIUM AND EXTRACRANIAL SOFT TISSUES:  Near-complete opacification of the left mastoid air cells is seen      No acute intracranial abnormality  Near complete opacification of left mastoid air cells  Clinical correlation for mastoiditis is recommended Workstation performed: [id number]     Pe Study With Ct Abdomen And Pelvis With Contrast    Result Date: 1/3/2019  CT PULMONARY ANGIOGRAM OF THE CHEST AND CT ABDOMEN AND PELVIS WITH INTRAVENOUS CONTRAST INDICATION:   AMS, hypoxia, vomiting  COMPARISON:  None  TECHNIQUE:  CT examination of the chest, abdomen and pelvis was performed  Thin section CT angiographic technique was used in the chest in order to evaluate for pulmonary embolus and coronal 3D MIP postprocessing was performed on the acquisition scanner  Axial, sagittal, and coronal 2D reformatted images were created from the source data and submitted for interpretation  Radiation dose length product (DLP) for this visit:  463 mGy-cm   This examination, like all CT scans performed in the [facility], was performed utilizing techniques to minimize radiation dose exposure, including the use of iterative reconstruction and automated exposure control  IV Contrast:  70 mL of iodixanol (VISIPAQUE) Enteric Contrast:  Enteric contrast was not administered  FINDINGS: CHEST PULMONARY ARTERIAL TREE:  No pulmonary embolus is seen  LUNGS:  Patchy bilateral consolidative airspace disease noted  There is no tracheal or endobronchial lesion  PLEURA:  Unremarkable  HEART/AORTA:  Coronary artery and aortic atherosclerotic changes noted  No pericardial effusion  No thoracic aortic aneurysm  MEDIASTINUM AND FARNKLIN:  Unremarkable  CHEST WALL AND LOWER NECK:   Unremarkable  ABDOMEN LIVER/BILIARY TREE:  One or more subcentimeter sharply circumscribed low-density hepatic lesion(s) are noted, too small to accurately characterize, but statistically most likely to represent subcentimeter hepatic cysts  No suspicious solid hepatic lesion is identified  Hepatic contours are normal   No biliary dilatation   GALLBLADDER: No calcified gallstones  No pericholecystic inflammatory change  SPLEEN:  Unremarkable  PANCREAS:  Unremarkable  ADRENAL GLANDS:  Unremarkable  KIDNEYS/URETERS:  Unremarkable  No hydronephrosis  STOMACH AND BOWEL:  Fluid-filled stomach    APPENDIX:  No findings to suggest appendicitis  ABDOMINOPELVIC CAVITY:  No ascites or free intraperitoneal air  No lymphadenopathy  VESSELS:  Atherosclerotic changes are present  No evidence of aneurysm  PELVIS REPRODUCTIVE ORGANS:  Unremarkable for patient's age  URINARY BLADDER:  Unremarkable  ABDOMINAL WALL/INGUINAL REGIONS:  Unremarkable  OSSEOUS STRUCTURES:  No acute fracture or destructive osseous lesion  Patchy bilateral consolidative airspace disease throughout the lungs, consistent with infectious process  Findings may be secondary to aspiration, given markedly fluid distended stomach    Findings discussed with Dr Jeanna Morel at 2:26 AM, 1/3/2019 Workstation performed: [id number]        Current Medications     Current Outpatient Prescriptions:     Ascorbic Acid (VITAMIN C) 100 MG tablet, Take 100 mg by mouth daily, Disp: , Rfl:     multivitamin (THERAGRAN) TABS, Take 1 tablet by mouth daily, Disp: , Rfl:     senna-docusate sodium (SENOKOT S) 8 6-50 mg per tablet, Take 1 tablet by mouth daily as needed for constipation, Disp: 15 tablet, Rfl: 0    vitamin E 100 UNIT capsule, Take 180 Units by mouth daily, Disp: , Rfl:     guaiFENesin 1200 MG TB12, Take 0 5 tablets (600 mg total) by mouth every 12 (twelve) hours for 14 days (Patient not taking: Reported on 1/24/2019 ), Disp: 14 tablet, Rfl: 0    Rebecca Hayward MD  [place] [id number]

## 2019-02-07 ENCOUNTER — HOSPITAL ENCOUNTER (OUTPATIENT)
Dept: RADIOLOGY | Facility: HOSPITAL | Age: 84
Discharge: HOME | End: 2019-02-07
Payer: MEDICARE

## 2019-02-07 DIAGNOSIS — J69.0 ASPIRATION PNEUMONIA (HCC): ICD-10-CM

## 2019-02-07 PROCEDURE — 71046 X-RAY EXAM CHEST 2 VIEWS: CPT

## 2019-02-15 ENCOUNTER — TELEPHONE (OUTPATIENT)
Dept: FAMILY MEDICINE CLINIC | Facility: CLINIC | Age: 84
End: 2019-02-15

## 2020-10-22 ENCOUNTER — OFFICE VISIT (OUTPATIENT)
Dept: URGENT CARE | Facility: CLINIC | Age: 85
End: 2020-10-22
Payer: MEDICARE

## 2020-10-22 VITALS
HEART RATE: 58 BPM | DIASTOLIC BLOOD PRESSURE: 64 MMHG | RESPIRATION RATE: 16 BRPM | TEMPERATURE: 98.1 F | SYSTOLIC BLOOD PRESSURE: 136 MMHG

## 2020-10-22 DIAGNOSIS — N30.90 CYSTITIS: Primary | ICD-10-CM

## 2020-10-22 LAB
SL AMB  POCT GLUCOSE, UA: NEGATIVE
SL AMB LEUKOCYTE ESTERASE,UA: ABNORMAL
SL AMB POCT BILIRUBIN,UA: NEGATIVE
SL AMB POCT BLOOD,UA: ABNORMAL
SL AMB POCT CLARITY,UA: ABNORMAL
SL AMB POCT COLOR,UA: YELLOW
SL AMB POCT KETONES,UA: NEGATIVE
SL AMB POCT NITRITE,UA: NEGATIVE
SL AMB POCT PH,UA: 7
SL AMB POCT SPECIFIC GRAVITY,UA: 1.02
SL AMB POCT URINE PROTEIN: ABNORMAL
SL AMB POCT UROBILINOGEN: 0.2

## 2020-10-22 PROCEDURE — G0463 HOSPITAL OUTPT CLINIC VISIT: HCPCS | Performed by: PHYSICIAN ASSISTANT

## 2020-10-22 PROCEDURE — 99213 OFFICE O/P EST LOW 20 MIN: CPT | Performed by: PHYSICIAN ASSISTANT

## 2020-10-22 PROCEDURE — 81002 URINALYSIS NONAUTO W/O SCOPE: CPT | Performed by: PHYSICIAN ASSISTANT

## 2020-10-22 RX ORDER — SULFAMETHOXAZOLE AND TRIMETHOPRIM 800; 160 MG/1; MG/1
1 TABLET ORAL EVERY 12 HOURS SCHEDULED
Qty: 14 TABLET | Refills: 0 | Status: SHIPPED | OUTPATIENT
Start: 2020-10-22 | End: 2020-10-29

## 2020-11-17 ENCOUNTER — APPOINTMENT (INPATIENT)
Dept: ULTRASOUND IMAGING | Facility: HOSPITAL | Age: 85
DRG: 871 | End: 2020-11-17
Payer: MEDICARE

## 2020-11-17 ENCOUNTER — APPOINTMENT (EMERGENCY)
Dept: CT IMAGING | Facility: HOSPITAL | Age: 85
DRG: 871 | End: 2020-11-17
Payer: MEDICARE

## 2020-11-17 ENCOUNTER — OFFICE VISIT (OUTPATIENT)
Dept: FAMILY MEDICINE CLINIC | Facility: CLINIC | Age: 85
End: 2020-11-17
Payer: MEDICARE

## 2020-11-17 ENCOUNTER — HOSPITAL ENCOUNTER (INPATIENT)
Facility: HOSPITAL | Age: 85
LOS: 5 days | Discharge: NURSING, REHAB OR LONG-TERM CARE | DRG: 871 | End: 2020-11-22
Attending: EMERGENCY MEDICINE | Admitting: INTERNAL MEDICINE
Payer: MEDICARE

## 2020-11-17 ENCOUNTER — APPOINTMENT (EMERGENCY)
Dept: RADIOLOGY | Facility: HOSPITAL | Age: 85
DRG: 871 | End: 2020-11-17
Payer: MEDICARE

## 2020-11-17 VITALS — HEART RATE: 83 BPM | OXYGEN SATURATION: 93 %

## 2020-11-17 DIAGNOSIS — R41.82 ALTERED MENTAL STATUS, UNSPECIFIED ALTERED MENTAL STATUS TYPE: ICD-10-CM

## 2020-11-17 DIAGNOSIS — E86.0 DEHYDRATION: ICD-10-CM

## 2020-11-17 DIAGNOSIS — E87.1 HYPONATREMIA: ICD-10-CM

## 2020-11-17 DIAGNOSIS — R41.89 UNRESPONSIVE EPISODE: ICD-10-CM

## 2020-11-17 DIAGNOSIS — S80.812A ABRASION OF ANTERIOR LEFT LOWER LEG, INITIAL ENCOUNTER: ICD-10-CM

## 2020-11-17 DIAGNOSIS — R30.0 DYSURIA: Primary | ICD-10-CM

## 2020-11-17 DIAGNOSIS — R41.82 ALTERED MENTAL STATUS: Primary | ICD-10-CM

## 2020-11-17 DIAGNOSIS — D72.829 LEUKOCYTOSIS: ICD-10-CM

## 2020-11-17 DIAGNOSIS — F03.90 DEMENTIA (HCC): ICD-10-CM

## 2020-11-17 PROBLEM — G93.40 ENCEPHALOPATHY: Status: ACTIVE | Noted: 2020-11-17

## 2020-11-17 PROBLEM — E87.0 HYPERNATREMIA: Status: ACTIVE | Noted: 2020-11-17

## 2020-11-17 PROBLEM — B35.1 ONYCHOMYCOSIS OF TOENAIL: Status: RESOLVED | Noted: 2019-01-03 | Resolved: 2020-11-17

## 2020-11-17 PROBLEM — J96.01 ACUTE RESPIRATORY FAILURE WITH HYPOXIA (HCC): Status: RESOLVED | Noted: 2019-01-03 | Resolved: 2020-11-17

## 2020-11-17 PROBLEM — R73.9 HYPERGLYCEMIA: Status: RESOLVED | Noted: 2019-01-03 | Resolved: 2020-11-17

## 2020-11-17 PROBLEM — J69.0 ASPIRATION PNEUMONIA OF BOTH LUNGS DUE TO GASTRIC SECRETIONS (HCC): Status: RESOLVED | Noted: 2019-01-24 | Resolved: 2020-11-17

## 2020-11-17 PROBLEM — IMO0001 TRANSITION OF CARE PERFORMED WITH SHARING OF CLINICAL SUMMARY: Status: RESOLVED | Noted: 2019-01-24 | Resolved: 2020-11-17

## 2020-11-17 PROBLEM — A41.9 SEPSIS (HCC): Status: ACTIVE | Noted: 2020-11-17

## 2020-11-17 PROBLEM — A41.9 SEVERE SEPSIS (HCC): Status: RESOLVED | Noted: 2019-01-03 | Resolved: 2020-11-17

## 2020-11-17 PROBLEM — T17.910A ASPIRATION OF GASTRIC CONTENTS: Status: RESOLVED | Noted: 2019-01-03 | Resolved: 2020-11-17

## 2020-11-17 PROBLEM — E87.0 HYPERNATREMIA: Status: RESOLVED | Noted: 2019-01-05 | Resolved: 2020-11-17

## 2020-11-17 PROBLEM — G93.41 ACUTE METABOLIC ENCEPHALOPATHY: Status: RESOLVED | Noted: 2019-01-03 | Resolved: 2020-11-17

## 2020-11-17 PROBLEM — R65.20 SEVERE SEPSIS (HCC): Status: RESOLVED | Noted: 2019-01-03 | Resolved: 2020-11-17

## 2020-11-17 LAB
ALBUMIN SERPL BCP-MCNC: 3.1 G/DL (ref 3.5–5)
ALP SERPL-CCNC: 94 U/L (ref 46–116)
ALT SERPL W P-5'-P-CCNC: 44 U/L (ref 12–78)
ANION GAP SERPL CALCULATED.3IONS-SCNC: 13 MMOL/L (ref 4–13)
APTT PPP: 30 SECONDS (ref 23–37)
AST SERPL W P-5'-P-CCNC: 29 U/L (ref 5–45)
ATRIAL RATE: 357 BPM
BASOPHILS # BLD AUTO: 0.05 THOUSANDS/ΜL (ref 0–0.1)
BASOPHILS NFR BLD AUTO: 0 % (ref 0–1)
BILIRUB SERPL-MCNC: 0.5 MG/DL (ref 0.2–1)
BUN SERPL-MCNC: 61 MG/DL (ref 5–25)
CALCIUM ALBUM COR SERPL-MCNC: 10.3 MG/DL (ref 8.3–10.1)
CALCIUM SERPL-MCNC: 9.6 MG/DL (ref 8.3–10.1)
CHLORIDE SERPL-SCNC: 118 MMOL/L (ref 100–108)
CO2 SERPL-SCNC: 27 MMOL/L (ref 21–32)
CREAT SERPL-MCNC: 1.19 MG/DL (ref 0.6–1.3)
EOSINOPHIL # BLD AUTO: 0.05 THOUSAND/ΜL (ref 0–0.61)
EOSINOPHIL NFR BLD AUTO: 0 % (ref 0–6)
ERYTHROCYTE [DISTWIDTH] IN BLOOD BY AUTOMATED COUNT: 13.6 % (ref 11.6–15.1)
GFR SERPL CREATININE-BSD FRML MDRD: 40 ML/MIN/1.73SQ M
GLUCOSE SERPL-MCNC: 107 MG/DL (ref 65–140)
HCT VFR BLD AUTO: 49.9 % (ref 34.8–46.1)
HGB BLD-MCNC: 15 G/DL (ref 11.5–15.4)
IMM GRANULOCYTES # BLD AUTO: 0.07 THOUSAND/UL (ref 0–0.2)
IMM GRANULOCYTES NFR BLD AUTO: 1 % (ref 0–2)
INR PPP: 1.19 (ref 0.84–1.19)
LACTATE SERPL-SCNC: 1.3 MMOL/L (ref 0.5–2)
LACTATE SERPL-SCNC: 1.3 MMOL/L (ref 0.5–2)
LACTATE SERPL-SCNC: 2.8 MMOL/L (ref 0.5–2)
LYMPHOCYTES # BLD AUTO: 1.34 THOUSANDS/ΜL (ref 0.6–4.47)
LYMPHOCYTES NFR BLD AUTO: 9 % (ref 14–44)
MCH RBC QN AUTO: 30.4 PG (ref 26.8–34.3)
MCHC RBC AUTO-ENTMCNC: 30.1 G/DL (ref 31.4–37.4)
MCV RBC AUTO: 101 FL (ref 82–98)
MONOCYTES # BLD AUTO: 0.89 THOUSAND/ΜL (ref 0.17–1.22)
MONOCYTES NFR BLD AUTO: 6 % (ref 4–12)
NEUTROPHILS # BLD AUTO: 13.08 THOUSANDS/ΜL (ref 1.85–7.62)
NEUTS SEG NFR BLD AUTO: 84 % (ref 43–75)
NRBC BLD AUTO-RTO: 0 /100 WBCS
P AXIS: 236 DEGREES
PLATELET # BLD AUTO: 181 THOUSANDS/UL (ref 149–390)
PLATELET # BLD AUTO: 221 THOUSANDS/UL (ref 149–390)
PMV BLD AUTO: 9.6 FL (ref 8.9–12.7)
PMV BLD AUTO: 9.7 FL (ref 8.9–12.7)
POTASSIUM SERPL-SCNC: 4.3 MMOL/L (ref 3.5–5.3)
PROCALCITONIN SERPL-MCNC: 0.16 NG/ML
PROT SERPL-MCNC: 7.3 G/DL (ref 6.4–8.2)
PROTHROMBIN TIME: 15.2 SECONDS (ref 11.6–14.5)
QRS AXIS: -53 DEGREES
QRSD INTERVAL: 104 MS
QT INTERVAL: 372 MS
QTC INTERVAL: 424 MS
RBC # BLD AUTO: 4.93 MILLION/UL (ref 3.81–5.12)
SODIUM SERPL-SCNC: 158 MMOL/L (ref 136–145)
T WAVE AXIS: 32 DEGREES
TROPONIN I SERPL-MCNC: 0.04 NG/ML
VENTRICULAR RATE: 78 BPM
WBC # BLD AUTO: 15.48 THOUSAND/UL (ref 4.31–10.16)

## 2020-11-17 PROCEDURE — G1004 CDSM NDSC: HCPCS

## 2020-11-17 PROCEDURE — 96361 HYDRATE IV INFUSION ADD-ON: CPT

## 2020-11-17 PROCEDURE — 84484 ASSAY OF TROPONIN QUANT: CPT | Performed by: EMERGENCY MEDICINE

## 2020-11-17 PROCEDURE — 99223 1ST HOSP IP/OBS HIGH 75: CPT | Performed by: INTERNAL MEDICINE

## 2020-11-17 PROCEDURE — 99285 EMERGENCY DEPT VISIT HI MDM: CPT

## 2020-11-17 PROCEDURE — 85730 THROMBOPLASTIN TIME PARTIAL: CPT | Performed by: EMERGENCY MEDICINE

## 2020-11-17 PROCEDURE — 87040 BLOOD CULTURE FOR BACTERIA: CPT | Performed by: EMERGENCY MEDICINE

## 2020-11-17 PROCEDURE — 36415 COLL VENOUS BLD VENIPUNCTURE: CPT | Performed by: EMERGENCY MEDICINE

## 2020-11-17 PROCEDURE — G0008 ADMIN INFLUENZA VIRUS VAC: HCPCS | Performed by: PHYSICIAN ASSISTANT

## 2020-11-17 PROCEDURE — 85049 AUTOMATED PLATELET COUNT: CPT | Performed by: PHYSICIAN ASSISTANT

## 2020-11-17 PROCEDURE — 85610 PROTHROMBIN TIME: CPT | Performed by: EMERGENCY MEDICINE

## 2020-11-17 PROCEDURE — 71045 X-RAY EXAM CHEST 1 VIEW: CPT

## 2020-11-17 PROCEDURE — 1123F ACP DISCUSS/DSCN MKR DOCD: CPT | Performed by: INTERNAL MEDICINE

## 2020-11-17 PROCEDURE — 80053 COMPREHEN METABOLIC PANEL: CPT | Performed by: EMERGENCY MEDICINE

## 2020-11-17 PROCEDURE — 99285 EMERGENCY DEPT VISIT HI MDM: CPT | Performed by: EMERGENCY MEDICINE

## 2020-11-17 PROCEDURE — 93010 ELECTROCARDIOGRAM REPORT: CPT | Performed by: INTERNAL MEDICINE

## 2020-11-17 PROCEDURE — 70450 CT HEAD/BRAIN W/O DYE: CPT

## 2020-11-17 PROCEDURE — 96360 HYDRATION IV INFUSION INIT: CPT

## 2020-11-17 PROCEDURE — 85025 COMPLETE CBC W/AUTO DIFF WBC: CPT | Performed by: EMERGENCY MEDICINE

## 2020-11-17 PROCEDURE — 90662 IIV NO PRSV INCREASED AG IM: CPT | Performed by: PHYSICIAN ASSISTANT

## 2020-11-17 PROCEDURE — 83605 ASSAY OF LACTIC ACID: CPT | Performed by: PHYSICIAN ASSISTANT

## 2020-11-17 PROCEDURE — 83605 ASSAY OF LACTIC ACID: CPT | Performed by: EMERGENCY MEDICINE

## 2020-11-17 PROCEDURE — 76770 US EXAM ABDO BACK WALL COMP: CPT

## 2020-11-17 PROCEDURE — 99215 OFFICE O/P EST HI 40 MIN: CPT | Performed by: FAMILY MEDICINE

## 2020-11-17 PROCEDURE — 93005 ELECTROCARDIOGRAM TRACING: CPT

## 2020-11-17 PROCEDURE — 84145 PROCALCITONIN (PCT): CPT | Performed by: EMERGENCY MEDICINE

## 2020-11-17 RX ORDER — SODIUM CHLORIDE 450 MG/100ML
100 INJECTION, SOLUTION INTRAVENOUS CONTINUOUS
Status: DISCONTINUED | OUTPATIENT
Start: 2020-11-17 | End: 2020-11-17

## 2020-11-17 RX ORDER — ACETAMINOPHEN 325 MG/1
650 TABLET ORAL EVERY 6 HOURS PRN
Status: DISCONTINUED | OUTPATIENT
Start: 2020-11-17 | End: 2020-11-22 | Stop reason: HOSPADM

## 2020-11-17 RX ORDER — HEPARIN SODIUM 5000 [USP'U]/ML
5000 INJECTION, SOLUTION INTRAVENOUS; SUBCUTANEOUS EVERY 8 HOURS SCHEDULED
Status: DISCONTINUED | OUTPATIENT
Start: 2020-11-17 | End: 2020-11-22 | Stop reason: HOSPADM

## 2020-11-17 RX ORDER — SENNOSIDES 8.6 MG
1 TABLET ORAL DAILY
Status: DISCONTINUED | OUTPATIENT
Start: 2020-11-18 | End: 2020-11-22 | Stop reason: HOSPADM

## 2020-11-17 RX ORDER — SODIUM CHLORIDE 9 MG/ML
3 INJECTION INTRAVENOUS
Status: DISCONTINUED | OUTPATIENT
Start: 2020-11-17 | End: 2020-11-22 | Stop reason: HOSPADM

## 2020-11-17 RX ORDER — DEXTROSE AND SODIUM CHLORIDE 5; .45 G/100ML; G/100ML
100 INJECTION, SOLUTION INTRAVENOUS CONTINUOUS
Status: DISCONTINUED | OUTPATIENT
Start: 2020-11-17 | End: 2020-11-18

## 2020-11-17 RX ORDER — ONDANSETRON 2 MG/ML
4 INJECTION INTRAMUSCULAR; INTRAVENOUS EVERY 6 HOURS PRN
Status: DISCONTINUED | OUTPATIENT
Start: 2020-11-17 | End: 2020-11-22 | Stop reason: HOSPADM

## 2020-11-17 RX ORDER — CALCIUM CARBONATE 200(500)MG
1000 TABLET,CHEWABLE ORAL DAILY PRN
Status: DISCONTINUED | OUTPATIENT
Start: 2020-11-17 | End: 2020-11-22 | Stop reason: HOSPADM

## 2020-11-17 RX ADMIN — DEXTROSE AND SODIUM CHLORIDE 100 ML/HR: 5; .45 INJECTION, SOLUTION INTRAVENOUS at 16:55

## 2020-11-17 RX ADMIN — INFLUENZA A VIRUS A/MICHIGAN/45/2015 X-275 (H1N1) ANTIGEN (FORMALDEHYDE INACTIVATED), INFLUENZA A VIRUS A/SINGAPORE/INFIMH-16-0019/2016 IVR-186 (H3N2) ANTIGEN (FORMALDEHYDE INACTIVATED), INFLUENZA B VIRUS B/PHUKET/3073/2013 ANTIGEN (FORMALDEHYDE INACTIVATED), AND INFLUENZA B VIRUS B/MARYLAND/15/2016 BX-69A ANTIGEN (FORMALDEHYDE INACTIVATED) 0.7 ML: 60; 60; 60; 60 INJECTION, SUSPENSION INTRAMUSCULAR at 21:47

## 2020-11-17 RX ADMIN — SODIUM CHLORIDE 1000 ML: 0.9 INJECTION, SOLUTION INTRAVENOUS at 12:37

## 2020-11-18 PROBLEM — R33.9 URINARY RETENTION: Status: ACTIVE | Noted: 2020-11-18

## 2020-11-18 PROBLEM — N39.0 UTI (URINARY TRACT INFECTION): Status: ACTIVE | Noted: 2020-11-18

## 2020-11-18 PROBLEM — G93.41 METABOLIC ENCEPHALOPATHY: Status: ACTIVE | Noted: 2020-11-17

## 2020-11-18 LAB
ALBUMIN SERPL BCP-MCNC: 2.7 G/DL (ref 3.5–5)
ALP SERPL-CCNC: 83 U/L (ref 46–116)
ALT SERPL W P-5'-P-CCNC: 35 U/L (ref 12–78)
ANION GAP SERPL CALCULATED.3IONS-SCNC: 7 MMOL/L (ref 4–13)
AST SERPL W P-5'-P-CCNC: 18 U/L (ref 5–45)
BACTERIA UR QL AUTO: ABNORMAL /HPF
BASOPHILS # BLD AUTO: 0.04 THOUSANDS/ΜL (ref 0–0.1)
BASOPHILS NFR BLD AUTO: 0 % (ref 0–1)
BILIRUB SERPL-MCNC: 0.49 MG/DL (ref 0.2–1)
BILIRUB UR QL STRIP: NEGATIVE
BUN SERPL-MCNC: 49 MG/DL (ref 5–25)
CALCIUM ALBUM COR SERPL-MCNC: 9.9 MG/DL (ref 8.3–10.1)
CALCIUM SERPL-MCNC: 8.9 MG/DL (ref 8.3–10.1)
CHLORIDE SERPL-SCNC: 121 MMOL/L (ref 100–108)
CLARITY UR: CLEAR
CO2 SERPL-SCNC: 28 MMOL/L (ref 21–32)
COLOR UR: YELLOW
CREAT SERPL-MCNC: 0.96 MG/DL (ref 0.6–1.3)
EOSINOPHIL # BLD AUTO: 0.12 THOUSAND/ΜL (ref 0–0.61)
EOSINOPHIL NFR BLD AUTO: 1 % (ref 0–6)
ERYTHROCYTE [DISTWIDTH] IN BLOOD BY AUTOMATED COUNT: 13.6 % (ref 11.6–15.1)
GFR SERPL CREATININE-BSD FRML MDRD: 52 ML/MIN/1.73SQ M
GLUCOSE SERPL-MCNC: 117 MG/DL (ref 65–140)
GLUCOSE UR STRIP-MCNC: NEGATIVE MG/DL
HCT VFR BLD AUTO: 47 % (ref 34.8–46.1)
HGB BLD-MCNC: 14.3 G/DL (ref 11.5–15.4)
HGB UR QL STRIP.AUTO: ABNORMAL
IMM GRANULOCYTES # BLD AUTO: 0.05 THOUSAND/UL (ref 0–0.2)
IMM GRANULOCYTES NFR BLD AUTO: 1 % (ref 0–2)
KETONES UR STRIP-MCNC: NEGATIVE MG/DL
LEUKOCYTE ESTERASE UR QL STRIP: ABNORMAL
LYMPHOCYTES # BLD AUTO: 1.85 THOUSANDS/ΜL (ref 0.6–4.47)
LYMPHOCYTES NFR BLD AUTO: 17 % (ref 14–44)
MCH RBC QN AUTO: 30.6 PG (ref 26.8–34.3)
MCHC RBC AUTO-ENTMCNC: 30.4 G/DL (ref 31.4–37.4)
MCV RBC AUTO: 101 FL (ref 82–98)
MONOCYTES # BLD AUTO: 0.52 THOUSAND/ΜL (ref 0.17–1.22)
MONOCYTES NFR BLD AUTO: 5 % (ref 4–12)
NEUTROPHILS # BLD AUTO: 8.5 THOUSANDS/ΜL (ref 1.85–7.62)
NEUTS SEG NFR BLD AUTO: 76 % (ref 43–75)
NITRITE UR QL STRIP: POSITIVE
NON-SQ EPI CELLS URNS QL MICRO: ABNORMAL /HPF
NRBC BLD AUTO-RTO: 0 /100 WBCS
PH UR STRIP.AUTO: 6 [PH]
PLATELET # BLD AUTO: 196 THOUSANDS/UL (ref 149–390)
PMV BLD AUTO: 9.5 FL (ref 8.9–12.7)
POTASSIUM SERPL-SCNC: 4.5 MMOL/L (ref 3.5–5.3)
PROCALCITONIN SERPL-MCNC: <0.05 NG/ML
PROT SERPL-MCNC: 6.2 G/DL (ref 6.4–8.2)
PROT UR STRIP-MCNC: ABNORMAL MG/DL
RBC # BLD AUTO: 4.67 MILLION/UL (ref 3.81–5.12)
RBC #/AREA URNS AUTO: ABNORMAL /HPF
SODIUM SERPL-SCNC: 156 MMOL/L (ref 136–145)
SP GR UR STRIP.AUTO: 1.02 (ref 1–1.03)
UROBILINOGEN UR QL STRIP.AUTO: 0.2 E.U./DL
WBC # BLD AUTO: 11.08 THOUSAND/UL (ref 4.31–10.16)
WBC #/AREA URNS AUTO: ABNORMAL /HPF

## 2020-11-18 PROCEDURE — 87077 CULTURE AEROBIC IDENTIFY: CPT | Performed by: INTERNAL MEDICINE

## 2020-11-18 PROCEDURE — 99232 SBSQ HOSP IP/OBS MODERATE 35: CPT | Performed by: PHYSICIAN ASSISTANT

## 2020-11-18 PROCEDURE — 84145 PROCALCITONIN (PCT): CPT | Performed by: PHYSICIAN ASSISTANT

## 2020-11-18 PROCEDURE — 80053 COMPREHEN METABOLIC PANEL: CPT | Performed by: PHYSICIAN ASSISTANT

## 2020-11-18 PROCEDURE — 85025 COMPLETE CBC W/AUTO DIFF WBC: CPT | Performed by: PHYSICIAN ASSISTANT

## 2020-11-18 PROCEDURE — 99223 1ST HOSP IP/OBS HIGH 75: CPT | Performed by: NURSE PRACTITIONER

## 2020-11-18 PROCEDURE — 97163 PT EVAL HIGH COMPLEX 45 MIN: CPT

## 2020-11-18 PROCEDURE — 51703 INSERT BLADDER CATH COMPLEX: CPT | Performed by: NURSE PRACTITIONER

## 2020-11-18 PROCEDURE — 81001 URINALYSIS AUTO W/SCOPE: CPT | Performed by: PHYSICIAN ASSISTANT

## 2020-11-18 PROCEDURE — 99223 1ST HOSP IP/OBS HIGH 75: CPT | Performed by: INTERNAL MEDICINE

## 2020-11-18 PROCEDURE — 87086 URINE CULTURE/COLONY COUNT: CPT | Performed by: INTERNAL MEDICINE

## 2020-11-18 PROCEDURE — 0T9B70Z DRAINAGE OF BLADDER WITH DRAINAGE DEVICE, VIA NATURAL OR ARTIFICIAL OPENING: ICD-10-PCS | Performed by: UROLOGY

## 2020-11-18 PROCEDURE — 87186 SC STD MICRODIL/AGAR DIL: CPT | Performed by: INTERNAL MEDICINE

## 2020-11-18 RX ORDER — DEXTROSE AND SODIUM CHLORIDE 5; .2 G/100ML; G/100ML
75 INJECTION, SOLUTION INTRAVENOUS CONTINUOUS
Status: DISCONTINUED | OUTPATIENT
Start: 2020-11-18 | End: 2020-11-19

## 2020-11-18 RX ADMIN — DEXTROSE AND SODIUM CHLORIDE 100 ML/HR: 5; .45 INJECTION, SOLUTION INTRAVENOUS at 11:24

## 2020-11-18 RX ADMIN — DEXTROSE AND SODIUM CHLORIDE 125 ML/HR: 5; .2 INJECTION, SOLUTION INTRAVENOUS at 21:44

## 2020-11-18 RX ADMIN — DEXTROSE AND SODIUM CHLORIDE 125 ML/HR: 5; .2 INJECTION, SOLUTION INTRAVENOUS at 12:52

## 2020-11-18 RX ADMIN — HEPARIN SODIUM 5000 UNITS: 5000 INJECTION INTRAVENOUS; SUBCUTANEOUS at 21:05

## 2020-11-18 RX ADMIN — HEPARIN SODIUM 5000 UNITS: 5000 INJECTION INTRAVENOUS; SUBCUTANEOUS at 06:46

## 2020-11-18 RX ADMIN — CEFTRIAXONE SODIUM 1000 MG: 10 INJECTION, POWDER, FOR SOLUTION INTRAVENOUS at 11:24

## 2020-11-18 RX ADMIN — HEPARIN SODIUM 5000 UNITS: 5000 INJECTION INTRAVENOUS; SUBCUTANEOUS at 13:00

## 2020-11-19 PROBLEM — E43 SEVERE PROTEIN-CALORIE MALNUTRITION (HCC): Status: ACTIVE | Noted: 2020-11-19

## 2020-11-19 LAB
ANION GAP SERPL CALCULATED.3IONS-SCNC: 8 MMOL/L (ref 4–13)
BUN SERPL-MCNC: 38 MG/DL (ref 5–25)
CALCIUM SERPL-MCNC: 8 MG/DL (ref 8.3–10.1)
CHLORIDE SERPL-SCNC: 113 MMOL/L (ref 100–108)
CO2 SERPL-SCNC: 21 MMOL/L (ref 21–32)
CREAT SERPL-MCNC: 0.76 MG/DL (ref 0.6–1.3)
CREAT UR-MCNC: 99.2 MG/DL
ERYTHROCYTE [DISTWIDTH] IN BLOOD BY AUTOMATED COUNT: 13.2 % (ref 11.6–15.1)
GFR SERPL CREATININE-BSD FRML MDRD: 68 ML/MIN/1.73SQ M
GLUCOSE SERPL-MCNC: 114 MG/DL (ref 65–140)
HCT VFR BLD AUTO: 39.2 % (ref 34.8–46.1)
HGB BLD-MCNC: 12 G/DL (ref 11.5–15.4)
MCH RBC QN AUTO: 30.6 PG (ref 26.8–34.3)
MCHC RBC AUTO-ENTMCNC: 30.6 G/DL (ref 31.4–37.4)
MCV RBC AUTO: 100 FL (ref 82–98)
PLATELET # BLD AUTO: 164 THOUSANDS/UL (ref 149–390)
PMV BLD AUTO: 10.1 FL (ref 8.9–12.7)
POTASSIUM SERPL-SCNC: 3.7 MMOL/L (ref 3.5–5.3)
RBC # BLD AUTO: 3.92 MILLION/UL (ref 3.81–5.12)
SODIUM 24H UR-SCNC: 79 MOL/L
SODIUM SERPL-SCNC: 142 MMOL/L (ref 136–145)
WBC # BLD AUTO: 10.07 THOUSAND/UL (ref 4.31–10.16)

## 2020-11-19 PROCEDURE — 99232 SBSQ HOSP IP/OBS MODERATE 35: CPT | Performed by: INTERNAL MEDICINE

## 2020-11-19 PROCEDURE — 99232 SBSQ HOSP IP/OBS MODERATE 35: CPT | Performed by: PHYSICIAN ASSISTANT

## 2020-11-19 PROCEDURE — 84300 ASSAY OF URINE SODIUM: CPT | Performed by: INTERNAL MEDICINE

## 2020-11-19 PROCEDURE — 85027 COMPLETE CBC AUTOMATED: CPT | Performed by: PHYSICIAN ASSISTANT

## 2020-11-19 PROCEDURE — 82570 ASSAY OF URINE CREATININE: CPT | Performed by: INTERNAL MEDICINE

## 2020-11-19 PROCEDURE — 80048 BASIC METABOLIC PNL TOTAL CA: CPT | Performed by: PHYSICIAN ASSISTANT

## 2020-11-19 RX ORDER — DEXTROSE AND SODIUM CHLORIDE 5; .45 G/100ML; G/100ML
50 INJECTION, SOLUTION INTRAVENOUS CONTINUOUS
Status: DISCONTINUED | OUTPATIENT
Start: 2020-11-19 | End: 2020-11-22 | Stop reason: HOSPADM

## 2020-11-19 RX ADMIN — HEPARIN SODIUM 5000 UNITS: 5000 INJECTION INTRAVENOUS; SUBCUTANEOUS at 06:25

## 2020-11-19 RX ADMIN — DEXTROSE AND SODIUM CHLORIDE 50 ML/HR: 5; .45 INJECTION, SOLUTION INTRAVENOUS at 11:46

## 2020-11-19 RX ADMIN — DEXTROSE AND SODIUM CHLORIDE 75 ML/HR: 5; .2 INJECTION, SOLUTION INTRAVENOUS at 08:01

## 2020-11-19 RX ADMIN — HEPARIN SODIUM 5000 UNITS: 5000 INJECTION INTRAVENOUS; SUBCUTANEOUS at 23:24

## 2020-11-19 RX ADMIN — CEFTRIAXONE SODIUM 1000 MG: 10 INJECTION, POWDER, FOR SOLUTION INTRAVENOUS at 13:10

## 2020-11-19 RX ADMIN — HEPARIN SODIUM 5000 UNITS: 5000 INJECTION INTRAVENOUS; SUBCUTANEOUS at 13:10

## 2020-11-20 LAB
ANION GAP SERPL CALCULATED.3IONS-SCNC: 8 MMOL/L (ref 4–13)
BACTERIA UR CULT: ABNORMAL
BACTERIA UR CULT: ABNORMAL
BUN SERPL-MCNC: 39 MG/DL (ref 5–25)
CALCIUM SERPL-MCNC: 8.3 MG/DL (ref 8.3–10.1)
CHLORIDE SERPL-SCNC: 110 MMOL/L (ref 100–108)
CO2 SERPL-SCNC: 24 MMOL/L (ref 21–32)
CREAT SERPL-MCNC: 0.82 MG/DL (ref 0.6–1.3)
ERYTHROCYTE [DISTWIDTH] IN BLOOD BY AUTOMATED COUNT: 13 % (ref 11.6–15.1)
GFR SERPL CREATININE-BSD FRML MDRD: 62 ML/MIN/1.73SQ M
GLUCOSE SERPL-MCNC: 105 MG/DL (ref 65–140)
HCT VFR BLD AUTO: 39.1 % (ref 34.8–46.1)
HGB BLD-MCNC: 12.3 G/DL (ref 11.5–15.4)
MCH RBC QN AUTO: 30.3 PG (ref 26.8–34.3)
MCHC RBC AUTO-ENTMCNC: 31.5 G/DL (ref 31.4–37.4)
MCV RBC AUTO: 96 FL (ref 82–98)
PLATELET # BLD AUTO: 176 THOUSANDS/UL (ref 149–390)
PMV BLD AUTO: 10.4 FL (ref 8.9–12.7)
POTASSIUM SERPL-SCNC: 3.5 MMOL/L (ref 3.5–5.3)
RBC # BLD AUTO: 4.06 MILLION/UL (ref 3.81–5.12)
SODIUM SERPL-SCNC: 142 MMOL/L (ref 136–145)
WBC # BLD AUTO: 12.86 THOUSAND/UL (ref 4.31–10.16)

## 2020-11-20 PROCEDURE — 99232 SBSQ HOSP IP/OBS MODERATE 35: CPT | Performed by: PHYSICIAN ASSISTANT

## 2020-11-20 PROCEDURE — 97166 OT EVAL MOD COMPLEX 45 MIN: CPT

## 2020-11-20 PROCEDURE — 80048 BASIC METABOLIC PNL TOTAL CA: CPT | Performed by: PHYSICIAN ASSISTANT

## 2020-11-20 PROCEDURE — 85027 COMPLETE CBC AUTOMATED: CPT | Performed by: PHYSICIAN ASSISTANT

## 2020-11-20 RX ADMIN — DEXTROSE AND SODIUM CHLORIDE 50 ML/HR: 5; .45 INJECTION, SOLUTION INTRAVENOUS at 05:57

## 2020-11-20 RX ADMIN — SENNOSIDES 8.6 MG: 8.6 TABLET, FILM COATED ORAL at 10:20

## 2020-11-20 RX ADMIN — CEFTRIAXONE SODIUM 1000 MG: 10 INJECTION, POWDER, FOR SOLUTION INTRAVENOUS at 10:25

## 2020-11-20 RX ADMIN — HEPARIN SODIUM 5000 UNITS: 5000 INJECTION INTRAVENOUS; SUBCUTANEOUS at 05:52

## 2020-11-20 RX ADMIN — HEPARIN SODIUM 5000 UNITS: 5000 INJECTION INTRAVENOUS; SUBCUTANEOUS at 15:00

## 2020-11-20 RX ADMIN — HEPARIN SODIUM 5000 UNITS: 5000 INJECTION INTRAVENOUS; SUBCUTANEOUS at 22:17

## 2020-11-21 LAB
ANION GAP SERPL CALCULATED.3IONS-SCNC: 8 MMOL/L (ref 4–13)
BASOPHILS # BLD AUTO: 0.03 THOUSANDS/ΜL (ref 0–0.1)
BASOPHILS NFR BLD AUTO: 0 % (ref 0–1)
BUN SERPL-MCNC: 31 MG/DL (ref 5–25)
CALCIUM SERPL-MCNC: 8 MG/DL (ref 8.3–10.1)
CHLORIDE SERPL-SCNC: 111 MMOL/L (ref 100–108)
CO2 SERPL-SCNC: 24 MMOL/L (ref 21–32)
CREAT SERPL-MCNC: 0.61 MG/DL (ref 0.6–1.3)
EOSINOPHIL # BLD AUTO: 0.28 THOUSAND/ΜL (ref 0–0.61)
EOSINOPHIL NFR BLD AUTO: 3 % (ref 0–6)
ERYTHROCYTE [DISTWIDTH] IN BLOOD BY AUTOMATED COUNT: 13.1 % (ref 11.6–15.1)
FLUAV RNA RESP QL NAA+PROBE: NEGATIVE
FLUBV RNA RESP QL NAA+PROBE: NEGATIVE
GFR SERPL CREATININE-BSD FRML MDRD: 79 ML/MIN/1.73SQ M
GLUCOSE SERPL-MCNC: 98 MG/DL (ref 65–140)
HCT VFR BLD AUTO: 35.2 % (ref 34.8–46.1)
HGB BLD-MCNC: 11.1 G/DL (ref 11.5–15.4)
IMM GRANULOCYTES # BLD AUTO: 0.07 THOUSAND/UL (ref 0–0.2)
IMM GRANULOCYTES NFR BLD AUTO: 1 % (ref 0–2)
LYMPHOCYTES # BLD AUTO: 2.13 THOUSANDS/ΜL (ref 0.6–4.47)
LYMPHOCYTES NFR BLD AUTO: 23 % (ref 14–44)
MCH RBC QN AUTO: 30.7 PG (ref 26.8–34.3)
MCHC RBC AUTO-ENTMCNC: 31.5 G/DL (ref 31.4–37.4)
MCV RBC AUTO: 98 FL (ref 82–98)
MONOCYTES # BLD AUTO: 0.59 THOUSAND/ΜL (ref 0.17–1.22)
MONOCYTES NFR BLD AUTO: 6 % (ref 4–12)
NEUTROPHILS # BLD AUTO: 6.2 THOUSANDS/ΜL (ref 1.85–7.62)
NEUTS SEG NFR BLD AUTO: 67 % (ref 43–75)
NRBC BLD AUTO-RTO: 0 /100 WBCS
PLATELET # BLD AUTO: 162 THOUSANDS/UL (ref 149–390)
PMV BLD AUTO: 10.2 FL (ref 8.9–12.7)
POTASSIUM SERPL-SCNC: 3.6 MMOL/L (ref 3.5–5.3)
RBC # BLD AUTO: 3.61 MILLION/UL (ref 3.81–5.12)
RSV RNA RESP QL NAA+PROBE: NEGATIVE
SARS-COV-2 RNA RESP QL NAA+PROBE: NEGATIVE
SODIUM SERPL-SCNC: 143 MMOL/L (ref 136–145)
WBC # BLD AUTO: 9.3 THOUSAND/UL (ref 4.31–10.16)

## 2020-11-21 PROCEDURE — 99232 SBSQ HOSP IP/OBS MODERATE 35: CPT | Performed by: PHYSICIAN ASSISTANT

## 2020-11-21 PROCEDURE — 0241U HB NFCT DS VIR RESP RNA 4 TRGT: CPT | Performed by: PHYSICIAN ASSISTANT

## 2020-11-21 PROCEDURE — 80048 BASIC METABOLIC PNL TOTAL CA: CPT | Performed by: PHYSICIAN ASSISTANT

## 2020-11-21 PROCEDURE — 85025 COMPLETE CBC W/AUTO DIFF WBC: CPT | Performed by: PHYSICIAN ASSISTANT

## 2020-11-21 RX ADMIN — DEXTROSE AND SODIUM CHLORIDE 50 ML/HR: 5; .45 INJECTION, SOLUTION INTRAVENOUS at 21:26

## 2020-11-21 RX ADMIN — SENNOSIDES 8.6 MG: 8.6 TABLET, FILM COATED ORAL at 10:00

## 2020-11-21 RX ADMIN — HEPARIN SODIUM 5000 UNITS: 5000 INJECTION INTRAVENOUS; SUBCUTANEOUS at 05:32

## 2020-11-21 RX ADMIN — HEPARIN SODIUM 5000 UNITS: 5000 INJECTION INTRAVENOUS; SUBCUTANEOUS at 21:28

## 2020-11-21 RX ADMIN — CEFTRIAXONE SODIUM 1000 MG: 10 INJECTION, POWDER, FOR SOLUTION INTRAVENOUS at 12:01

## 2020-11-21 RX ADMIN — DEXTROSE AND SODIUM CHLORIDE 50 ML/HR: 5; .45 INJECTION, SOLUTION INTRAVENOUS at 00:44

## 2020-11-21 RX ADMIN — HEPARIN SODIUM 5000 UNITS: 5000 INJECTION INTRAVENOUS; SUBCUTANEOUS at 13:37

## 2020-11-22 ENCOUNTER — NURSING HOME VISIT (OUTPATIENT)
Dept: GERIATRICS | Facility: OTHER | Age: 85
End: 2020-11-22
Payer: MEDICARE

## 2020-11-22 ENCOUNTER — TELEPHONE (OUTPATIENT)
Dept: OTHER | Facility: OTHER | Age: 85
End: 2020-11-22

## 2020-11-22 VITALS
TEMPERATURE: 97.7 F | HEART RATE: 69 BPM | BODY MASS INDEX: 16.65 KG/M2 | WEIGHT: 88.18 LBS | HEIGHT: 61 IN | SYSTOLIC BLOOD PRESSURE: 148 MMHG | DIASTOLIC BLOOD PRESSURE: 82 MMHG | RESPIRATION RATE: 18 BRPM | OXYGEN SATURATION: 94 %

## 2020-11-22 VITALS
WEIGHT: 88.4 LBS | DIASTOLIC BLOOD PRESSURE: 54 MMHG | SYSTOLIC BLOOD PRESSURE: 99 MMHG | TEMPERATURE: 97.5 F | HEART RATE: 70 BPM | OXYGEN SATURATION: 93 % | BODY MASS INDEX: 16.7 KG/M2 | RESPIRATION RATE: 14 BRPM

## 2020-11-22 DIAGNOSIS — N30.00 ACUTE CYSTITIS WITHOUT HEMATURIA: ICD-10-CM

## 2020-11-22 DIAGNOSIS — G93.41 METABOLIC ENCEPHALOPATHY: Primary | ICD-10-CM

## 2020-11-22 DIAGNOSIS — R33.9 URINARY RETENTION: ICD-10-CM

## 2020-11-22 DIAGNOSIS — F01.50 VASCULAR DEMENTIA WITHOUT BEHAVIORAL DISTURBANCE (HCC): ICD-10-CM

## 2020-11-22 DIAGNOSIS — E43 SEVERE PROTEIN-CALORIE MALNUTRITION (HCC): ICD-10-CM

## 2020-11-22 PROBLEM — A41.9 SEPSIS (HCC): Status: RESOLVED | Noted: 2020-11-17 | Resolved: 2020-11-22

## 2020-11-22 PROBLEM — E87.0 HYPERNATREMIA: Status: RESOLVED | Noted: 2020-11-17 | Resolved: 2020-11-22

## 2020-11-22 LAB
BACTERIA BLD CULT: NORMAL
BACTERIA BLD CULT: NORMAL

## 2020-11-22 PROCEDURE — 99239 HOSP IP/OBS DSCHRG MGMT >30: CPT | Performed by: PHYSICIAN ASSISTANT

## 2020-11-22 PROCEDURE — 99305 1ST NF CARE MODERATE MDM 35: CPT | Performed by: INTERNAL MEDICINE

## 2020-11-22 RX ORDER — ACETAMINOPHEN 325 MG/1
650 TABLET ORAL EVERY 6 HOURS PRN
Refills: 0
Start: 2020-11-22

## 2020-11-22 RX ADMIN — HEPARIN SODIUM 5000 UNITS: 5000 INJECTION INTRAVENOUS; SUBCUTANEOUS at 05:01

## 2020-11-22 RX ADMIN — SENNOSIDES 8.6 MG: 8.6 TABLET, FILM COATED ORAL at 09:40

## 2020-11-22 RX ADMIN — CEFTRIAXONE SODIUM 1000 MG: 10 INJECTION, POWDER, FOR SOLUTION INTRAVENOUS at 10:28

## 2020-11-23 ENCOUNTER — TRANSITIONAL CARE MANAGEMENT (OUTPATIENT)
Dept: FAMILY MEDICINE CLINIC | Facility: CLINIC | Age: 85
End: 2020-11-23

## 2020-11-23 ENCOUNTER — PATIENT OUTREACH (OUTPATIENT)
Dept: CASE MANAGEMENT | Facility: HOSPITAL | Age: 85
End: 2020-11-23

## 2020-12-01 ENCOUNTER — TELEMEDICINE (OUTPATIENT)
Dept: GERIATRICS | Facility: OTHER | Age: 85
End: 2020-12-01
Payer: MEDICARE

## 2020-12-01 VITALS
BODY MASS INDEX: 16.63 KG/M2 | SYSTOLIC BLOOD PRESSURE: 141 MMHG | RESPIRATION RATE: 16 BRPM | TEMPERATURE: 98.2 F | WEIGHT: 88 LBS | OXYGEN SATURATION: 94 % | DIASTOLIC BLOOD PRESSURE: 82 MMHG | HEART RATE: 90 BPM

## 2020-12-01 DIAGNOSIS — R33.9 URINARY RETENTION: ICD-10-CM

## 2020-12-01 DIAGNOSIS — N30.00 ACUTE CYSTITIS WITHOUT HEMATURIA: ICD-10-CM

## 2020-12-01 DIAGNOSIS — F01.50 VASCULAR DEMENTIA WITHOUT BEHAVIORAL DISTURBANCE (HCC): ICD-10-CM

## 2020-12-01 DIAGNOSIS — E43 SEVERE PROTEIN-CALORIE MALNUTRITION (HCC): Primary | ICD-10-CM

## 2020-12-01 DIAGNOSIS — D72.829 LEUKOCYTOSIS, UNSPECIFIED TYPE: ICD-10-CM

## 2020-12-01 PROCEDURE — 99316 NF DSCHRG MGMT 30 MIN+: CPT | Performed by: INTERNAL MEDICINE

## 2020-12-02 ENCOUNTER — PATIENT OUTREACH (OUTPATIENT)
Dept: CASE MANAGEMENT | Facility: HOSPITAL | Age: 85
End: 2020-12-02

## 2020-12-04 ENCOUNTER — TELEPHONE (OUTPATIENT)
Dept: GERIATRICS | Age: 85
End: 2020-12-04

## 2020-12-04 ENCOUNTER — PATIENT OUTREACH (OUTPATIENT)
Dept: CASE MANAGEMENT | Facility: HOSPITAL | Age: 85
End: 2020-12-04

## 2020-12-07 ENCOUNTER — PATIENT OUTREACH (OUTPATIENT)
Dept: CASE MANAGEMENT | Facility: OTHER | Age: 85
End: 2020-12-07

## 2020-12-07 ENCOUNTER — EPISODE CHANGES (OUTPATIENT)
Dept: CASE MANAGEMENT | Facility: OTHER | Age: 85
End: 2020-12-07

## 2020-12-07 ENCOUNTER — PATIENT OUTREACH (OUTPATIENT)
Dept: CASE MANAGEMENT | Facility: HOSPITAL | Age: 85
End: 2020-12-07

## 2021-10-14 NOTE — PHYSICAL THERAPY NOTE
Health Maintenance Due   Topic Date Due   • Hepatitis B Vaccine (1 of 3 - Risk 3-dose series) Never done   • Diabetes Foot Exam  02/01/2020   • Diabetes A1C  06/15/2021   • Diabetes Eye Exam  08/27/2021   • Influenza Vaccine (1) 09/01/2021       Patient is due for topics listed above, he wishes to proceed with Diabetes A1C and Diabetes Foot Exam, but is not proceeding with Immunization(s) Hep B and Influenza and Diabetes Eye Exam at this time. The following has occurred: will be discuss with provider today.      Recent PHQ 2/9 Score    PHQ 2:  Date Adult PHQ 2 Score Adult PHQ 2 Interpretation   10/14/2021 0 No further screening needed       PHQ 9:             Vaccine Information Statement(s) was given today. This has been reviewed, questions answered, and verbal consent given by Patient for injection(s) and administration of Hepatitis B and Influenza (Inactivated).      Patient tolerated without incident. See immunization grid for documentation.             PHYSICAL THERAPY NOTE    Patient Name: Lizzeth Amador  Today's Date: 1/7/2019 01/07/19 1300   Pain Assessment   Pain Assessment No/denies pain   Pain Score No Pain   Restrictions/Precautions   Weight Bearing Precautions Per Order No   Other Precautions Chair Alarm; Bed Alarm;Cognitive;Multiple lines;Telemetry;O2;Fall Risk   General   Family/Caregiver Present No   Subjective   Subjective Patient seated EOB with nursing upon entry to room  Patient agreeable to therapy session with personal identifers obtained from name & ID bracelet  Bed Mobility   Supine to Sit Unable to assess   Sit to Supine Unable to assess   Additional Comments Patient seated OOB in recliner post session with chair alarm engaged, call bell and belongings in reach  Transfers   Sit to Stand 4  Minimal assistance   Additional items Assist x 1; Increased time required;Verbal cues; Impulsive   Stand to Sit 4  Minimal assistance   Additional items Assist x 1; Impulsive;Verbal cues   Ambulation/Elevation   Gait pattern Forward Flexion;Narrow JERI; Decreased foot clearance;Shuffling; Short stride; Excessively slow; Redundant gait at times; Inconsistent nima   Gait Assistance 3  Moderate assist   Additional items Assist x 1;Verbal cues  (RW management and steadying)   Assistive Device Rolling walker   Distance 42' x1, 15' x2   Balance   Static Sitting Fair   Dynamic Sitting Poor   Static Standing Poor +   Dynamic Standing Poor   Ambulatory Poor  (with roller walker)   Endurance Deficit   Endurance Deficit Yes   Endurance Deficit Description limited ambulation and activity tolerance   Activity Tolerance   Activity Tolerance Patient limited by fatigue   Nurse Made Aware Spoke to [facility]    Assessment   Prognosis Fair   Problem List Decreased strength;Decreased endurance; Impaired balance;Decreased mobility; Decreased safety awareness   Assessment Patient impulsive and self transfering from EOB upon nursing and therapist entry to room  Patient demonstrated ability to transfer sit<>stand with consistent min ax 1 with multiple trials from EOB and recliner chair  Patient ambulated short distances with roller walker requiring mod a x1 and chair follow for safety  Patient requires assistance for steadying adn significant walker managment  Poor understanding of verbal instructions and demonstration provided for walker use  Easily distracted requiring redirection to task and for safety  Short simple commands with greater follow through with increased processing time  Arrival of lunch tray, limited patient further participation  Continue to focus on OOB mobility with progression of transfer and gait with roller walker as appropriate  Goals   Patient Goals to see her children   STG Expiration Date 01/13/19   Treatment Day 2   Plan   Treatment/Interventions Functional transfer training;LE strengthening/ROM; Therapeutic exercise; Endurance training;Cognitive reorientation;Patient/family training;Equipment eval/education; Bed mobility;Gait training   Progress Slow progress, decreased activity tolerance   PT Frequency 5x/wk  (and 1x/weekend)   Recommendation   Recommendation Short-term skilled PT   Equipment Recommended Other (Comment)  (roller walker)       Sixto Gonzalez, MIGEL
